# Patient Record
Sex: MALE | Race: BLACK OR AFRICAN AMERICAN | NOT HISPANIC OR LATINO | Employment: FULL TIME | RURAL
[De-identification: names, ages, dates, MRNs, and addresses within clinical notes are randomized per-mention and may not be internally consistent; named-entity substitution may affect disease eponyms.]

---

## 2021-10-01 ENCOUNTER — TELEPHONE (OUTPATIENT)
Dept: FAMILY MEDICINE CLINIC | Facility: CLINIC | Age: 31
End: 2021-10-01

## 2021-10-04 ENCOUNTER — OFFICE VISIT (OUTPATIENT)
Dept: FAMILY MEDICINE CLINIC | Facility: CLINIC | Age: 31
End: 2021-10-04

## 2021-10-04 VITALS
HEIGHT: 74 IN | WEIGHT: 315 LBS | DIASTOLIC BLOOD PRESSURE: 76 MMHG | BODY MASS INDEX: 40.43 KG/M2 | HEART RATE: 88 BPM | TEMPERATURE: 97.8 F | OXYGEN SATURATION: 98 % | SYSTOLIC BLOOD PRESSURE: 152 MMHG

## 2021-10-04 DIAGNOSIS — I82.90 DEEP VEIN THROMBOSIS (DVT) ASSOCIATED WITH COVID-19: Primary | ICD-10-CM

## 2021-10-04 DIAGNOSIS — U07.1 DEEP VEIN THROMBOSIS (DVT) ASSOCIATED WITH COVID-19: Primary | ICD-10-CM

## 2021-10-04 PROBLEM — I10 HYPERTENSIVE DISORDER: Status: ACTIVE | Noted: 2018-02-14

## 2021-10-04 PROBLEM — M25.511 PAIN IN JOINT OF RIGHT SHOULDER: Status: ACTIVE | Noted: 2018-02-14

## 2021-10-04 PROCEDURE — 99203 OFFICE O/P NEW LOW 30 MIN: CPT | Performed by: STUDENT IN AN ORGANIZED HEALTH CARE EDUCATION/TRAINING PROGRAM

## 2021-10-04 RX ORDER — APIXABAN 5 MG (74)
1 KIT ORAL EVERY 12 HOURS
COMMUNITY
Start: 2021-09-09 | End: 2021-10-04

## 2021-10-13 ENCOUNTER — TELEPHONE (OUTPATIENT)
Dept: FAMILY MEDICINE CLINIC | Facility: CLINIC | Age: 31
End: 2021-10-13

## 2021-10-21 ENCOUNTER — TELEPHONE (OUTPATIENT)
Dept: FAMILY MEDICINE CLINIC | Facility: CLINIC | Age: 31
End: 2021-10-21

## 2021-10-21 NOTE — TELEPHONE ENCOUNTER
[facility] call and stated that the patient Still has the DVT that was there in sept. They let him go since he had this clot before just FYI

## 2021-11-01 ENCOUNTER — TELEPHONE (OUTPATIENT)
Dept: FAMILY MEDICINE CLINIC | Facility: CLINIC | Age: 31
End: 2021-11-01

## 2021-11-01 NOTE — TELEPHONE ENCOUNTER
Received the results of the venous lower ext doppler. Dr Stratton said that DVT still present, unchanged. If unimproved after 3 months, will refer to specialist.    Pt has appt on 11/4. Will receive results at that time.

## 2021-11-02 DIAGNOSIS — I82.90 DEEP VEIN THROMBOSIS (DVT) ASSOCIATED WITH COVID-19: ICD-10-CM

## 2021-11-02 DIAGNOSIS — U07.1 DEEP VEIN THROMBOSIS (DVT) ASSOCIATED WITH COVID-19: ICD-10-CM

## 2021-11-03 ENCOUNTER — TELEPHONE (OUTPATIENT)
Dept: FAMILY MEDICINE CLINIC | Facility: CLINIC | Age: 31
End: 2021-11-03

## 2021-11-04 ENCOUNTER — OFFICE VISIT (OUTPATIENT)
Dept: FAMILY MEDICINE CLINIC | Facility: CLINIC | Age: 31
End: 2021-11-04

## 2021-11-04 VITALS
OXYGEN SATURATION: 97 % | WEIGHT: 315 LBS | HEART RATE: 88 BPM | BODY MASS INDEX: 40.43 KG/M2 | TEMPERATURE: 97.8 F | HEIGHT: 74 IN | DIASTOLIC BLOOD PRESSURE: 88 MMHG | SYSTOLIC BLOOD PRESSURE: 158 MMHG

## 2021-11-04 DIAGNOSIS — I82.90 DEEP VEIN THROMBOSIS (DVT) ASSOCIATED WITH COVID-19: Primary | ICD-10-CM

## 2021-11-04 DIAGNOSIS — U07.1 DEEP VEIN THROMBOSIS (DVT) ASSOCIATED WITH COVID-19: Primary | ICD-10-CM

## 2021-11-04 PROCEDURE — 99213 OFFICE O/P EST LOW 20 MIN: CPT | Performed by: STUDENT IN AN ORGANIZED HEALTH CARE EDUCATION/TRAINING PROGRAM

## 2021-11-04 NOTE — PROGRESS NOTES
"Subjective:  Jomar Ahn is a 31 y.o. male who presents for DVT follow-up.    DVT secondary to COVID-19 infection.  States right leg swelling has improved, pain has improved, however still larger than left.  On Eliquis, no issues medication, no easy bleeding, easy bruising, chest pain, shortness of breath, blood in stool, blood in urine, bloody cough.  Had ultrasound prior appointment, DVT still present in lower right popliteal vein as well as within deep veins in the calf.  Similar to prior exam.    Patient Active Problem List   Diagnosis   • Hypertensive disorder   • Pain in joint of right shoulder     Vitals:    Vitals:    11/04/21 1504   BP: 158/88   BP Location: Left arm   Patient Position: Sitting   Cuff Size: Large Adult   Pulse: 88   Temp: 97.8 °F (36.6 °C)   SpO2: 97%   Weight: (!) 176 kg (389 lb)   Height: 188 cm (74\")     Body mass index is 49.94 kg/m².      Current Outpatient Medications:   •  apixaban (ELIQUIS) 5 MG tablet tablet, Take 1 tablet by mouth Every 12 (Twelve) Hours., Disp: 90 tablet, Rfl: 0    Patient Active Problem List   Diagnosis   • Hypertensive disorder   • Pain in joint of right shoulder     Past Surgical History:   Procedure Laterality Date   • KNEE SURGERY Bilateral     x2     Social History     Socioeconomic History   • Marital status: Single   Tobacco Use   • Smoking status: Never Smoker   • Smokeless tobacco: Never Used   Vaping Use   • Vaping Use: Never used   Substance and Sexual Activity   • Alcohol use: No   • Drug use: Never   • Sexual activity: Defer     History reviewed. No pertinent family history.  No visits with results within 6 Month(s) from this visit.   Latest known visit with results is:   No results found for any previous visit.      No image results found.      @[email or web address]@  Immunization History   Administered Date(s) Administered   • Td 08/24/2004     The following portions of the patient's history were reviewed and updated as appropriate: allergies, current " medications, past family history, past medical history, past social history, past surgical history and problem list.    PHQ-9 Total Score:           Physical Exam  Constitutional:       General: He is not in acute distress.  HENT:      Head: Normocephalic and atraumatic.   Cardiovascular:      Rate and Rhythm: Normal rate and regular rhythm.      Heart sounds: Normal heart sounds. No murmur heard.      Pulmonary:      Effort: Pulmonary effort is normal.      Breath sounds: Normal breath sounds.   Abdominal:      Palpations: Abdomen is soft.      Tenderness: There is no abdominal tenderness.   Musculoskeletal:         General: No swelling or tenderness.      Right lower leg: Edema present.      Left lower leg: No edema.   Skin:     Coloration: Skin is not jaundiced.      Findings: No rash.   Neurological:      Mental Status: He is alert and oriented to person, place, and time. Mental status is at baseline.   Psychiatric:         Mood and Affect: Mood normal.         Behavior: Behavior normal.       Assessment/Plan    Diagnosis Plan   1. Deep vein thrombosis (DVT) associated with COVID-19        No orders of the defined types were placed in this encounter.    DVT; unfortunate ultrasound with no improvement of clot however patient clinically improving, tolerate medications well, no adverse effects, no exacerbation of symptoms, no signs of PE.  Reassuring.  Follow-up in 1 month, consider repeat ultrasound versus continued anticoagulation versus referral to specialist at that time.  Strict ED/return precautions given.  Additionally, discussed possible benefit of compression stockings, patient declined.          This document has been electronically signed by Oswaldo Stratton MD on November 4, 2021 16:03 CDT

## 2021-12-06 ENCOUNTER — LAB (OUTPATIENT)
Dept: LAB | Facility: HOSPITAL | Age: 31
End: 2021-12-06

## 2021-12-06 ENCOUNTER — OFFICE VISIT (OUTPATIENT)
Dept: FAMILY MEDICINE CLINIC | Facility: CLINIC | Age: 31
End: 2021-12-06

## 2021-12-06 VITALS
OXYGEN SATURATION: 100 % | HEART RATE: 74 BPM | BODY MASS INDEX: 40.43 KG/M2 | WEIGHT: 315 LBS | SYSTOLIC BLOOD PRESSURE: 164 MMHG | HEIGHT: 74 IN | TEMPERATURE: 97.8 F | DIASTOLIC BLOOD PRESSURE: 88 MMHG

## 2021-12-06 DIAGNOSIS — U07.1 DEEP VEIN THROMBOSIS (DVT) ASSOCIATED WITH COVID-19: Primary | ICD-10-CM

## 2021-12-06 DIAGNOSIS — I82.90 DEEP VEIN THROMBOSIS (DVT) ASSOCIATED WITH COVID-19: Primary | ICD-10-CM

## 2021-12-06 PROCEDURE — 85379 FIBRIN DEGRADATION QUANT: CPT | Performed by: STUDENT IN AN ORGANIZED HEALTH CARE EDUCATION/TRAINING PROGRAM

## 2021-12-06 PROCEDURE — 99213 OFFICE O/P EST LOW 20 MIN: CPT | Performed by: STUDENT IN AN ORGANIZED HEALTH CARE EDUCATION/TRAINING PROGRAM

## 2021-12-06 PROCEDURE — 85025 COMPLETE CBC W/AUTO DIFF WBC: CPT | Performed by: STUDENT IN AN ORGANIZED HEALTH CARE EDUCATION/TRAINING PROGRAM

## 2021-12-07 LAB
BASOPHILS # BLD AUTO: 0.04 10*3/MM3 (ref 0–0.2)
BASOPHILS NFR BLD AUTO: 0.7 % (ref 0–1.5)
D-DIMER, QUANTITATIVE (MAD,POW, STR): 319 NG/ML (FEU) (ref 0–470)
DEPRECATED RDW RBC AUTO: 37.4 FL (ref 37–54)
EOSINOPHIL # BLD AUTO: 0.21 10*3/MM3 (ref 0–0.4)
EOSINOPHIL NFR BLD AUTO: 3.4 % (ref 0.3–6.2)
ERYTHROCYTE [DISTWIDTH] IN BLOOD BY AUTOMATED COUNT: 13.4 % (ref 12.3–15.4)
HCT VFR BLD AUTO: 42.6 % (ref 37.5–51)
HGB BLD-MCNC: 14.1 G/DL (ref 13–17.7)
IMM GRANULOCYTES # BLD AUTO: 0.02 10*3/MM3 (ref 0–0.05)
IMM GRANULOCYTES NFR BLD AUTO: 0.3 % (ref 0–0.5)
LYMPHOCYTES # BLD AUTO: 1.75 10*3/MM3 (ref 0.7–3.1)
LYMPHOCYTES NFR BLD AUTO: 28.7 % (ref 19.6–45.3)
MCH RBC QN AUTO: 26.1 PG (ref 26.6–33)
MCHC RBC AUTO-ENTMCNC: 33.1 G/DL (ref 31.5–35.7)
MCV RBC AUTO: 78.7 FL (ref 79–97)
MONOCYTES # BLD AUTO: 0.63 10*3/MM3 (ref 0.1–0.9)
MONOCYTES NFR BLD AUTO: 10.3 % (ref 5–12)
NEUTROPHILS NFR BLD AUTO: 3.45 10*3/MM3 (ref 1.7–7)
NEUTROPHILS NFR BLD AUTO: 56.6 % (ref 42.7–76)
NRBC BLD AUTO-RTO: 0 /100 WBC (ref 0–0.2)
PLATELET # BLD AUTO: 284 10*3/MM3 (ref 140–450)
PMV BLD AUTO: 9.8 FL (ref 6–12)
RBC # BLD AUTO: 5.41 10*6/MM3 (ref 4.14–5.8)
WBC NRBC COR # BLD: 6.1 10*3/MM3 (ref 3.4–10.8)

## 2022-01-05 DIAGNOSIS — U07.1 DEEP VEIN THROMBOSIS (DVT) ASSOCIATED WITH COVID-19: ICD-10-CM

## 2022-01-05 DIAGNOSIS — I82.90 DEEP VEIN THROMBOSIS (DVT) ASSOCIATED WITH COVID-19: ICD-10-CM

## 2022-01-05 NOTE — TELEPHONE ENCOUNTER
Rx Refill Note  Requested Prescriptions     Pending Prescriptions Disp Refills   • apixaban (ELIQUIS) 5 MG tablet tablet 90 tablet 0     Sig: Take 1 tablet by mouth Every 12 (Twelve) Hours.      Last office visit with prescribing clinician: 12/6/2021      Next office visit with prescribing clinician: 3/7/2022            Jake Read Rep  01/05/22, 15:06 CST

## 2022-01-09 NOTE — PROGRESS NOTES
"Subjective:  Jomar Ahn is a 31 y.o. male who presents for     DVT; diagnosed in August after acute Covid infection, states swelling still present, worse with walking, standing, working, better with rest.  Denied any chest pain, shortness of breath, headaches, vision changes, hemoptysis, increased leg swelling, leg cramping, foot discoloration, skin lesions.  States has been adherent to Eliquis, but has had minimal improvement of left leg swelling.      Patient Active Problem List   Diagnosis   • Hypertensive disorder   • Pain in joint of right shoulder     Vitals:    Vitals:    12/06/21 1501   BP: 164/88   BP Location: Left arm   Patient Position: Sitting   Cuff Size: Large Adult   Pulse: 74   Temp: 97.8 °F (36.6 °C)   SpO2: 100%   Weight: (!) 177 kg (390 lb)   Height: 188 cm (74\")     Body mass index is 50.07 kg/m².      Current Outpatient Medications:   •  apixaban (ELIQUIS) 5 MG tablet tablet, Take 1 tablet by mouth Every 12 (Twelve) Hours for 30 days., Disp: 60 tablet, Rfl: 0    Patient Active Problem List   Diagnosis   • Hypertensive disorder   • Pain in joint of right shoulder     Past Surgical History:   Procedure Laterality Date   • KNEE SURGERY Bilateral     x2     Social History     Socioeconomic History   • Marital status: Single   Tobacco Use   • Smoking status: Never Smoker   • Smokeless tobacco: Never Used   Vaping Use   • Vaping Use: Never used   Substance and Sexual Activity   • Alcohol use: No   • Drug use: Never   • Sexual activity: Defer     History reviewed. No pertinent family history.  No visits with results within 6 Month(s) from this visit.   Latest known visit with results is:   No results found for any previous visit.      No image results found.      [unfilled]  Immunization History   Administered Date(s) Administered   • Td 08/24/2004     The following portions of the patient's history were reviewed and updated as appropriate: allergies, current medications, past family history, " past medical history, past social history, past surgical history and problem list.    PHQ-9 Total Score:           Physical Exam  Constitutional:       General: He is not in acute distress.  HENT:      Head: Normocephalic and atraumatic.   Cardiovascular:      Rate and Rhythm: Normal rate and regular rhythm.      Heart sounds: Normal heart sounds. No murmur heard.      Pulmonary:      Effort: Pulmonary effort is normal.      Breath sounds: Normal breath sounds.   Abdominal:      Palpations: Abdomen is soft.      Tenderness: There is no abdominal tenderness.   Musculoskeletal:         General: No swelling.      Right lower leg: No edema.      Left lower leg: Edema present.   Skin:     Coloration: Skin is not jaundiced.      Findings: No rash.   Neurological:      Mental Status: He is alert and oriented to person, place, and time. Mental status is at baseline.   Psychiatric:         Mood and Affect: Mood normal.         Behavior: Behavior normal.         Assessment/Plan    Diagnosis Plan   1. Deep vein thrombosis (DVT) associated with COVID-19  Ambulatory Referral to Vascular Surgery    CBC & Differential    D-dimer, Quantitative    Duplex Venous Lower Extremity - Bilateral CAR      Orders Placed This Encounter   Procedures   • D-dimer, Quantitative     Order Specific Question:   Release to patient     Answer:   Immediate   • CBC Auto Differential   • Ambulatory Referral to Vascular Surgery     Referral Priority:   Routine     Referral Type:   Consultation     Referral Reason:   Specialty Services Required     Requested Specialty:   Vascular Surgery     Number of Visits Requested:   1   • CBC & Differential     Order Specific Question:   Manual Differential     Answer:   No     DVT; labs as above, repeat ultrasound, refer to vascular surgery due to concern for post thrombotic syndrome, no red flags on exam, reassuring.  Will continue Eliquis until seen by specialist.  Patient voiced understanding, agreeable to  plan.          This document has been electronically signed by Oswaldo Stratton MD on January 9, 2022 13:47 CST    EMR Dragon/Transciption Disclaimer: Some of this note may be an electronic transcription/translation of spoken language to printed text.  The electronic translation of spoken language may permit erroneous, or at times, nonsensical words or phrases to be inadvertently transcribed. Although I have reviewed the note for such errors, some may still exist.

## 2022-01-26 DIAGNOSIS — I82.531 CHRONIC DEEP VEIN THROMBOSIS (DVT) OF POPLITEAL VEIN OF RIGHT LOWER EXTREMITY: Primary | ICD-10-CM

## 2022-03-07 ENCOUNTER — OFFICE VISIT (OUTPATIENT)
Dept: FAMILY MEDICINE CLINIC | Facility: CLINIC | Age: 32
End: 2022-03-07

## 2022-03-07 VITALS
BODY MASS INDEX: 40.43 KG/M2 | DIASTOLIC BLOOD PRESSURE: 100 MMHG | SYSTOLIC BLOOD PRESSURE: 154 MMHG | TEMPERATURE: 98 F | HEART RATE: 96 BPM | HEIGHT: 74 IN | OXYGEN SATURATION: 98 % | WEIGHT: 315 LBS

## 2022-03-07 DIAGNOSIS — I82.90 DEEP VEIN THROMBOSIS (DVT) ASSOCIATED WITH COVID-19: Primary | ICD-10-CM

## 2022-03-07 DIAGNOSIS — I10 PRIMARY HYPERTENSION: ICD-10-CM

## 2022-03-07 DIAGNOSIS — U07.1 DEEP VEIN THROMBOSIS (DVT) ASSOCIATED WITH COVID-19: Primary | ICD-10-CM

## 2022-03-07 DIAGNOSIS — Z91.89 AT RISK FOR OBSTRUCTIVE SLEEP APNEA: ICD-10-CM

## 2022-03-07 PROCEDURE — 99214 OFFICE O/P EST MOD 30 MIN: CPT | Performed by: STUDENT IN AN ORGANIZED HEALTH CARE EDUCATION/TRAINING PROGRAM

## 2022-03-07 RX ORDER — LOSARTAN POTASSIUM 50 MG/1
50 TABLET ORAL NIGHTLY
Qty: 30 TABLET | Refills: 0 | Status: SHIPPED | OUTPATIENT
Start: 2022-03-07 | End: 2022-08-26 | Stop reason: SDUPTHER

## 2022-03-07 NOTE — PROGRESS NOTES
"Subjective:  Jomar Ahn is a 31 y.o. male who presents for     DVT; states swelling of the right leg has improved, no issues with Eliquis, no shortness of breath, chest pain, orthopnea, exercise intolerance, easy bleeding, easy bruising, hematuria, hematochezia, hematemesis.  Was referred to vascular surgery for concerns of post thrombotic syndrome, did not obtain duplex ultrasound and was unable to make appointment due to issues at home.    HTN; does not check blood pressure at home, has been consistently elevated over the last several appointments.  Has never been on medications for blood pressure in the past, does admit to snoring, nonrestorative sleep.  Denied any headaches, vision changes, numbness, tingling, weakness.      Patient Active Problem List   Diagnosis   • Hypertensive disorder   • Pain in joint of right shoulder     Vitals:    Vitals:    03/07/22 1621   BP: 154/100   BP Location: Left arm   Patient Position: Sitting   Cuff Size: Large Adult   Pulse: 96   Temp: 98 °F (36.7 °C)   SpO2: 98%   Weight: (!) 173 kg (381 lb)   Height: 188 cm (74\")     Body mass index is 48.92 kg/m².      Current Outpatient Medications:   •  apixaban (ELIQUIS) 5 MG tablet tablet, Take 1 tablet by mouth Every 12 (Twelve) Hours., Disp: 60 tablet, Rfl: 0  •  losartan (Cozaar) 50 MG tablet, Take 1 tablet by mouth Every Night., Disp: 30 tablet, Rfl: 0    Patient Active Problem List   Diagnosis   • Hypertensive disorder   • Pain in joint of right shoulder     Past Surgical History:   Procedure Laterality Date   • KNEE SURGERY Bilateral     x2     Social History     Socioeconomic History   • Marital status: Single   Tobacco Use   • Smoking status: Never Smoker   • Smokeless tobacco: Never Used   Vaping Use   • Vaping Use: Never used   Substance and Sexual Activity   • Alcohol use: No   • Drug use: Never   • Sexual activity: Defer     History reviewed. No pertinent family history.  Office Visit on 12/06/2021   Component Date " Value Ref Range Status   • D-Dimer, Quantitative 12/06/2021 319  0 - 470 ng/mL (FEU) Final   • WBC 12/06/2021 6.10  3.40 - 10.80 10*3/mm3 Final   • RBC 12/06/2021 5.41  4.14 - 5.80 10*6/mm3 Final   • Hemoglobin 12/06/2021 14.1  13.0 - 17.7 g/dL Final   • Hematocrit 12/06/2021 42.6  37.5 - 51.0 % Final   • MCV 12/06/2021 78.7 (A) 79.0 - 97.0 fL Final   • MCH 12/06/2021 26.1 (A) 26.6 - 33.0 pg Final   • MCHC 12/06/2021 33.1  31.5 - 35.7 g/dL Final   • RDW 12/06/2021 13.4  12.3 - 15.4 % Final   • RDW-SD 12/06/2021 37.4  37.0 - 54.0 fl Final   • MPV 12/06/2021 9.8  6.0 - 12.0 fL Final   • Platelets 12/06/2021 284  140 - 450 10*3/mm3 Final   • Neutrophil % 12/06/2021 56.6  42.7 - 76.0 % Final   • Lymphocyte % 12/06/2021 28.7  19.6 - 45.3 % Final   • Monocyte % 12/06/2021 10.3  5.0 - 12.0 % Final   • Eosinophil % 12/06/2021 3.4  0.3 - 6.2 % Final   • Basophil % 12/06/2021 0.7  0.0 - 1.5 % Final   • Immature Grans % 12/06/2021 0.3  0.0 - 0.5 % Final   • Neutrophils, Absolute 12/06/2021 3.45  1.70 - 7.00 10*3/mm3 Final   • Lymphocytes, Absolute 12/06/2021 1.75  0.70 - 3.10 10*3/mm3 Final   • Monocytes, Absolute 12/06/2021 0.63  0.10 - 0.90 10*3/mm3 Final   • Eosinophils, Absolute 12/06/2021 0.21  0.00 - 0.40 10*3/mm3 Final   • Basophils, Absolute 12/06/2021 0.04  0.00 - 0.20 10*3/mm3 Final   • Immature Grans, Absolute 12/06/2021 0.02  0.00 - 0.05 10*3/mm3 Final   • nRBC 12/06/2021 0.0  0.0 - 0.2 /100 WBC Final      No image results found.      [unfilled]  Immunization History   Administered Date(s) Administered   • Td 08/24/2004     The following portions of the patient's history were reviewed and updated as appropriate: allergies, current medications, past family history, past medical history, past social history, past surgical history and problem list.    PHQ-9 Total Score: 0         Physical Exam  Constitutional:       General: He is not in acute distress.     Appearance: He is obese.   HENT:      Head: Normocephalic  and atraumatic.   Cardiovascular:      Rate and Rhythm: Normal rate and regular rhythm.      Heart sounds: Normal heart sounds. No murmur heard.  Pulmonary:      Effort: Pulmonary effort is normal.      Breath sounds: Normal breath sounds.   Abdominal:      Palpations: Abdomen is soft.      Tenderness: There is no abdominal tenderness.   Musculoskeletal:         General: No swelling.      Right lower leg: Edema ( Minimally larger than right, much improved from previous.  No overlying skin changes.) present.      Left lower leg: No edema.   Skin:     Coloration: Skin is not jaundiced.      Findings: No rash.   Neurological:      Mental Status: He is alert and oriented to person, place, and time. Mental status is at baseline.   Psychiatric:         Mood and Affect: Mood normal.         Behavior: Behavior normal.         Assessment/Plan    Diagnosis Plan   1. Deep vein thrombosis (DVT) associated with COVID-19  Duplex Venous Lower Extremity - Bilateral CAR    apixaban (ELIQUIS) 5 MG tablet tablet   2. Primary hypertension  losartan (Cozaar) 50 MG tablet   3. At risk for obstructive sleep apnea  Ambulatory Referral to Sleep Medicine      Orders Placed This Encounter   Procedures   • Ambulatory Referral to Sleep Medicine     Referral Priority:   Routine     Number of Visits Requested:   1     DVT; improved from previous, continue to encourage compression stockings, duplex ultrasound, vascular surgeon follow-up.  If ultrasound negative, stop Eliquis as it was provoked event.     Hypertension; new diagnosis, discussed importance of blood pressure control, risk of uncontrolled hypertension.  Due to symptoms of sleep apnea, will refer for testing.  Asymptomatic, patient to bring in blood pressure log to follow-up in 1 month, after discussion, will start on losartan 50 mg daily.  Strict ER/return precautions given.          This document has been electronically signed by Oswaldo Stratton MD on March 7, 2022 16:36 CST    EMR  Dragon/Transciption Disclaimer: Some of this note may be an electronic transcription/translation of spoken language to printed text.  The electronic translation of spoken language may permit erroneous, or at times, nonsensical words or phrases to be inadvertently transcribed. Although I have reviewed the note for such errors, some may still exist.

## 2022-03-25 ENCOUNTER — OFFICE VISIT (OUTPATIENT)
Dept: SLEEP MEDICINE | Facility: HOSPITAL | Age: 32
End: 2022-03-25

## 2022-03-25 VITALS
HEART RATE: 90 BPM | SYSTOLIC BLOOD PRESSURE: 169 MMHG | OXYGEN SATURATION: 96 % | BODY MASS INDEX: 40.43 KG/M2 | WEIGHT: 315 LBS | HEIGHT: 74 IN | DIASTOLIC BLOOD PRESSURE: 102 MMHG

## 2022-03-25 DIAGNOSIS — E66.01 MORBID (SEVERE) OBESITY DUE TO EXCESS CALORIES: ICD-10-CM

## 2022-03-25 DIAGNOSIS — G47.19 EXCESSIVE DAYTIME SLEEPINESS: ICD-10-CM

## 2022-03-25 DIAGNOSIS — R06.83 SNORING: Primary | ICD-10-CM

## 2022-03-25 PROCEDURE — 99202 OFFICE O/P NEW SF 15 MIN: CPT | Performed by: NURSE PRACTITIONER

## 2022-03-25 NOTE — PROGRESS NOTES
New Patient Sleep Medicine Consultation    Encounter Date: 3/25/2022         Patient's PCP: Oswaldo Stratton MD  Referring provider: Oswaldo Stratton MD  Reason for consultation chief complaint: snoring and excessive daytime sleepiness, morning headaches     Jomar Ahn is a 31 y.o. male whose bedtime is ~ 2100. He  falls asleep after 20-30 minutes, and is up 1-2 times per night.Able to fall back asleep quickly. He wakes up ~ 0400.On the weekend he goes to bed ~ 2300 and gets up around [id number].  He endorses 6-8 hours of sleep. He drinks 0 cups of coffee, 0 teas, and 0 sodas per day. He drinks 0 alcoholic beverages per week.      Jomar Ahn admits to snoring, High blod pressure, excessive daytime sleepiness, morning headaches and Up to the bathroom at night. He denies cataplexy, sleep paralysis, or hypnagogic hallucinations. He takes no medicine for sleep. He has no sleepiness with driving. He naps occasionally. He has never had a sleep study. He sleeps in a bed alone.     He is a never smoker.       Marital status: single   Occupation: factory   Children: 0  FH of sleep disorders: not that he knows of       Past Medical History:   Diagnosis Date   • Bronchitis, chronic (HCC)    • Deep vein thrombosis (HCC)      Social History     Socioeconomic History   • Marital status: Single   Tobacco Use   • Smoking status: Never Smoker   • Smokeless tobacco: Never Used   Vaping Use   • Vaping Use: Never used   Substance and Sexual Activity   • Alcohol use: No   • Drug use: Never   • Sexual activity: Defer     No family history on file.      Review of Systems:  Constitutional: negative  Eyes: negative  Ears, nose, mouth, throat, and face: negative  Respiratory: negative  Cardiovascular: negative  Gastrointestinal: negative  Genitourinary:negative  Integument/breast: negative  Hematologic/lymphatic: negative  Musculoskeletal:negative  Neurological: negative  Behavioral/Psych: negative  Endocrine:  "negative  Allergic/Immunologic: negative Patient advised to discuss any positive ROS with PCP.      [place] - 6      Vitals:    03/25/22 1559   BP: (!) 169/102   Pulse: 90   SpO2: 96%           03/25/22  1559   Weight: (!) 176 kg (387 lb 8 oz)       Body mass index is 49.73 kg/m². Patient's Body mass index is 49.73 kg/m². indicating that he is morbidly obese (BMI > 40 or > 35 with obesity - related health condition). Obesity-related health conditions include the following: hypertension. Obesity is unchanged. BMI is is above average; BMI management plan is completed. We discussed portion control and increasing exercise..      Neck circumference: 20\"          General: Alert. Cooperative. Well developed. No acute distress.             Head:  Normocephalic. Symmetrical. Atraumatic.              Eyes: Sclera clear. No icterus. Normal EOM.             Ears: No deformities. Normal hearing.             Nose: No septal deviation. No drainage.          Throat: No oral lesions. No thrush. Moist mucous membranes. Trachea midline    Tongue is enlarged    Dentition is fair       Pharynx: Posterior pharyngeal pillars are narrow    Mallampati score of IV (only hard palate visible)    Pharynx is nonerythematous   Chest Wall:  Normal shape. Symmetric expansion with respiration. No tenderness.          Lungs:  Clear to auscultation bilaterally. No wheezes. No rhonchi. No rales. Respirations regular, even and unlabored.            Heart:  Regular rhythm and normal rate. Normal S1 and S2. No murmur.  Extremities:  Moves all extremities well. No edema.               Skin: Dry. Intact. No bleeding. No rash.           Neuro: Moves all 4 extremities and cranial nerves grossly intact.  Psychiatric: Normal mood and affect.      Current Outpatient Medications:   •  apixaban (ELIQUIS) 5 MG tablet tablet, Take 1 tablet by mouth Every 12 (Twelve) Hours., Disp: 60 tablet, Rfl: 0  •  losartan (Cozaar) 50 MG tablet, Take 1 tablet by mouth Every " Night., Disp: 30 tablet, Rfl: 0    Lab Results   Component Value Date    WBC 6.10 12/06/2021    HGB 14.1 12/06/2021    HCT 42.6 12/06/2021    MCV 78.7 (L) 12/06/2021     12/06/2021     No results found for: GLUCOSE, CALCIUM, NA, K, CO2, CL, BUN, CREATININE, EGFRIFAFRI, EGFRIFNONA, BCR, ANIONGAP  No results found for: INR, PROTIME  No results found for: CKTOTAL, CKMB, CKMBINDEX, TROPONINI, TROPONINT    No results found for: PHART, HIA9SZD, PO2ART]    Contraindications to home sleep test: none    Assessment and Plan:    1. Excessive daytime sleepiness- New (to me), additional work-up planned (4)  1. Check HST  2. Good sleep hygiene   3. Drowsy driving tips- do not drive if feeling sleepy   4. RTC in 2 weeks with study results   2.   Snoring- New to me, additional work-up planned    1. As above   4.   Morbid obesity   5.   Morning headaches       I obtained a brief history from the patient, reviewed the medical problems and current medications, and made medical decisions regarding treatment based on that information.   I spent 23 minutes caring for Jomar on this date of service. This time includes time spent by me in the following activities: preparing for the visit, obtaining and/or reviewing a separately obtained history, performing a medically appropriate examination and/or evaluation, counseling and educating the patient/family/caregiver, ordering medications, tests, or procedures and documenting information in the medical record. I answered all of his questions and he verbalized understanding. Discussion involved sleep hygiene, sleep apnea, health impact of sleep apnea, home sleep testing, CPAP and follow-up.           RTC 2 weeks after study for results.             This document has been electronically signed by KESHIA Jones on March 25, 2022 16:01 CDT          This document has been electronically signed by KESHIA Jones on March 25, 2022         CC: Oswaldo Stratton MD [place],  Oswaldo Robles MD

## 2022-04-04 ENCOUNTER — OFFICE VISIT (OUTPATIENT)
Dept: CARDIAC SURGERY | Facility: CLINIC | Age: 32
End: 2022-04-04

## 2022-04-04 VITALS
OXYGEN SATURATION: 97 % | HEIGHT: 74 IN | HEART RATE: 99 BPM | WEIGHT: 315 LBS | SYSTOLIC BLOOD PRESSURE: 148 MMHG | BODY MASS INDEX: 40.43 KG/M2 | TEMPERATURE: 98.4 F | DIASTOLIC BLOOD PRESSURE: 88 MMHG

## 2022-04-04 DIAGNOSIS — U07.1 DEEP VEIN THROMBOSIS (DVT) ASSOCIATED WITH COVID-19: ICD-10-CM

## 2022-04-04 DIAGNOSIS — I10 BENIGN ESSENTIAL HTN: Primary | ICD-10-CM

## 2022-04-04 DIAGNOSIS — I82.90 DEEP VEIN THROMBOSIS (DVT) ASSOCIATED WITH COVID-19: ICD-10-CM

## 2022-04-04 DIAGNOSIS — G47.33 OSA (OBSTRUCTIVE SLEEP APNEA): ICD-10-CM

## 2022-04-04 DIAGNOSIS — E66.01 CLASS 3 SEVERE OBESITY DUE TO EXCESS CALORIES WITH SERIOUS COMORBIDITY AND BODY MASS INDEX (BMI) OF 45.0 TO 49.9 IN ADULT: ICD-10-CM

## 2022-04-04 PROCEDURE — 99204 OFFICE O/P NEW MOD 45 MIN: CPT | Performed by: THORACIC SURGERY (CARDIOTHORACIC VASCULAR SURGERY)

## 2022-04-06 ENCOUNTER — HOSPITAL ENCOUNTER (OUTPATIENT)
Dept: SLEEP MEDICINE | Facility: HOSPITAL | Age: 32
Discharge: HOME | End: 2022-04-06
Admitting: NURSE PRACTITIONER

## 2022-04-06 DIAGNOSIS — E66.01 MORBID (SEVERE) OBESITY DUE TO EXCESS CALORIES: ICD-10-CM

## 2022-04-06 DIAGNOSIS — R06.83 SNORING: ICD-10-CM

## 2022-04-06 DIAGNOSIS — G47.19 EXCESSIVE DAYTIME SLEEPINESS: ICD-10-CM

## 2022-04-06 PROCEDURE — 95800 SLP STDY UNATTENDED: CPT

## 2022-04-06 PROCEDURE — 95800 SLP STDY UNATTENDED: CPT | Performed by: PSYCHIATRY & NEUROLOGY

## 2022-04-07 ENCOUNTER — PATIENT ROUNDING (BHMG ONLY) (OUTPATIENT)
Dept: CARDIAC SURGERY | Facility: CLINIC | Age: 32
End: 2022-04-07

## 2022-04-07 ENCOUNTER — OFFICE VISIT (OUTPATIENT)
Dept: FAMILY MEDICINE CLINIC | Facility: CLINIC | Age: 32
End: 2022-04-07

## 2022-04-07 VITALS
OXYGEN SATURATION: 98 % | DIASTOLIC BLOOD PRESSURE: 100 MMHG | SYSTOLIC BLOOD PRESSURE: 180 MMHG | WEIGHT: 188 LBS | HEART RATE: 98 BPM | TEMPERATURE: 98.7 F | HEIGHT: 74 IN | BODY MASS INDEX: 24.13 KG/M2

## 2022-04-07 DIAGNOSIS — I10 PRIMARY HYPERTENSION: Primary | ICD-10-CM

## 2022-04-07 DIAGNOSIS — I82.90 DEEP VEIN THROMBOSIS (DVT) ASSOCIATED WITH COVID-19: ICD-10-CM

## 2022-04-07 DIAGNOSIS — U07.1 DEEP VEIN THROMBOSIS (DVT) ASSOCIATED WITH COVID-19: ICD-10-CM

## 2022-04-07 PROCEDURE — 99213 OFFICE O/P EST LOW 20 MIN: CPT | Performed by: STUDENT IN AN ORGANIZED HEALTH CARE EDUCATION/TRAINING PROGRAM

## 2022-04-07 NOTE — PATIENT INSTRUCTIONS
Hypertension, Adult  Hypertension is another name for high blood pressure. High blood pressure forces your heart to work harder to pump blood. This can cause problems over time.  There are two numbers in a blood pressure reading. There is a top number (systolic) over a bottom number (diastolic). It is best to have a blood pressure that is below 120/80. Healthy choices can help lower your blood pressure, or you may need medicine to help lower it.  What are the causes?  The cause of this condition is not known. Some conditions may be related to high blood pressure.  What increases the risk?  Smoking.  Having type 2 diabetes mellitus, high cholesterol, or both.  Not getting enough exercise or physical activity.  Being overweight.  Having too much fat, sugar, calories, or salt (sodium) in your diet.  Drinking too much alcohol.  Having long-term (chronic) kidney disease.  Having a family history of high blood pressure.  Age. Risk increases with age.  Race. You may be at higher risk if you are .  Gender. Men are at higher risk than women before age 45. After age 65, women are at higher risk than men.  Having obstructive sleep apnea.  Stress.  What are the signs or symptoms?  High blood pressure may not cause symptoms. Very high blood pressure (hypertensive crisis) may cause:  Headache.  Feelings of worry or nervousness (anxiety).  Shortness of breath.  Nosebleed.  A feeling of being sick to your stomach (nausea).  Throwing up (vomiting).  Changes in how you see.  Very bad chest pain.  Seizures.  How is this treated?  This condition is treated by making healthy lifestyle changes, such as:  Eating healthy foods.  Exercising more.  Drinking less alcohol.  Your health care provider may prescribe medicine if lifestyle changes are not enough to get your blood pressure under control, and if:  Your top number is above 130.  Your bottom number is above 80.  Your personal target blood pressure may vary.  Follow  these instructions at home:  Eating and drinking    If told, follow the DASH eating plan. To follow this plan:  Fill one half of your plate at each meal with fruits and vegetables.  Fill one fourth of your plate at each meal with whole grains. Whole grains include whole-wheat pasta, brown rice, and whole-grain bread.  Eat or drink low-fat dairy products, such as skim milk or low-fat yogurt.  Fill one fourth of your plate at each meal with low-fat (lean) proteins. Low-fat proteins include fish, chicken without skin, eggs, beans, and tofu.  Avoid fatty meat, cured and processed meat, or chicken with skin.  Avoid pre-made or processed food.  Eat less than 1,500 mg of salt each day.  Do not drink alcohol if:  Your doctor tells you not to drink.  You are pregnant, may be pregnant, or are planning to become pregnant.  If you drink alcohol:  Limit how much you use to:  0-1 drink a day for women.  0-2 drinks a day for men.  Be aware of how much alcohol is in your drink. In the [place] one drink equals one 12 oz bottle of beer (355 mL), one 5 oz glass of wine (148 mL), or one 1½ oz glass of hard liquor (44 mL).    Lifestyle    Work with your doctor to stay at a healthy weight or to lose weight. Ask your doctor what the best weight is for you.  Get at least 30 minutes of exercise most days of the week. This may include walking, swimming, or biking.  Get at least 30 minutes of exercise that strengthens your muscles (resistance exercise) at least 3 days a week. This may include lifting weights or doing Pilates.  Do not use any products that contain nicotine or tobacco, such as cigarettes, e-cigarettes, and chewing tobacco. If you need help quitting, ask your doctor.  Check your blood pressure at home as told by your doctor.  Keep all follow-up visits as told by your doctor. This is important.    Medicines  Take over-the-counter and prescription medicines only as told by your doctor. Follow directions carefully.  Do not skip doses  of blood pressure medicine. The medicine does not work as well if you skip doses. Skipping doses also puts you at risk for problems.  Ask your doctor about side effects or reactions to medicines that you should watch for.  Contact a doctor if you:  Think you are having a reaction to the medicine you are taking.  Have headaches that keep coming back (recurring).  Feel dizzy.  Have swelling in your ankles.  Have trouble with your vision.  Get help right away if you:  Get a very bad headache.  Start to feel mixed up (confused).  Feel weak or numb.  Feel faint.  Have very bad pain in your:  Chest.  Belly (abdomen).  Throw up more than once.  Have trouble breathing.  Summary  Hypertension is another name for high blood pressure.  High blood pressure forces your heart to work harder to pump blood.  For most people, a normal blood pressure is less than 120/80.  Making healthy choices can help lower blood pressure. If your blood pressure does not get lower with healthy choices, you may need to take medicine.  This information is not intended to replace advice given to you by your health care provider. Make sure you discuss any questions you have with your health care provider.  Document Revised: 08/28/2019 Document Reviewed: 08/28/2019  [employer] Patient Education © 2021 [employer]

## 2022-04-07 NOTE — PROGRESS NOTES
April 7, 2022    Hello, may I speak with Jomar Ahn?    My name is ADONAY, CARDIOVASCULAR TECH      I am  with [facility] CARDIOTHORACIC SURGERY  [address] [place] [address] [address] [place] [address] [phone].    Before we get started may I verify your date of birth? 1990    I am calling to officially welcome you to our practice and ask about your recent visit. Is this a good time to talk? YES.     Tell me about your visit with us. What things went well?  YEAH, EVERYTHING WAS FINE. WENT REAL GOOD.        We're always looking for ways to make our patients' experiences even better. Do you have recommendations on ways we may improve?  NO.     Overall were you satisfied with your first visit to our practice? YES.        I appreciate you taking the time to speak with me today. Is there anything else I can do for you?  NO.       Thank you, and have a great day.

## 2022-04-07 NOTE — PROGRESS NOTES
"Subjective:  Jomar Ahn is a 31 y.o. male who presents for     HTN; states BP at home as ranged from 130-180/. Currently undergoing evaluation for sleep apnea.  At previous appointment, was started on losartan 50 mg daily.  Patient states due to financial issues, did not  medication.  Denied any symptoms of high blood pressure, no chest pain, shortness of breath, headaches, vision changes, numbness, tingling, weakness, leg swelling.    DVT; recently seen vascular surgery, states that discontinued off Eliquis due to negative ultrasound and provoked nature secondary to Covid.  No acute complaints today.        Patient Active Problem List   Diagnosis   • Hypertensive disorder   • Pain in joint of right shoulder     Vitals:    Vitals:    04/07/22 1634   BP: 180/100   BP Location: Left arm   Patient Position: Sitting   Pulse: 98   Temp: 98.7 °F (37.1 °C)   SpO2: 98%   Weight: 85.3 kg (188 lb)   Height: 188 cm (74\")     Body mass index is 24.14 kg/m².      Current Outpatient Medications:   •  losartan (Cozaar) 50 MG tablet, Take 1 tablet by mouth Every Night., Disp: 30 tablet, Rfl: 0    Patient Active Problem List   Diagnosis   • Hypertensive disorder   • Pain in joint of right shoulder     Past Surgical History:   Procedure Laterality Date   • KNEE SURGERY Bilateral     x2     Social History     Socioeconomic History   • Marital status: Single   Tobacco Use   • Smoking status: Never Smoker   • Smokeless tobacco: Never Used   Vaping Use   • Vaping Use: Never used   Substance and Sexual Activity   • Alcohol use: No   • Drug use: Never   • Sexual activity: Defer     History reviewed. No pertinent family history.  Hospital Outpatient Visit on 04/04/2022   Component Date Value Ref Range Status   • Target HR (85%) 04/04/2022 162  bpm In process   • Max. Pred. HR (100%) 04/04/2022 191  bpm In process   Office Visit on 12/06/2021   Component Date Value Ref Range Status   • D-Dimer, Quantitative 12/06/2021 319  " 0 - 470 ng/mL (FEU) Final   • WBC 12/06/2021 6.10  3.40 - 10.80 10*3/mm3 Final   • RBC 12/06/2021 5.41  4.14 - 5.80 10*6/mm3 Final   • Hemoglobin 12/06/2021 14.1  13.0 - 17.7 g/dL Final   • Hematocrit 12/06/2021 42.6  37.5 - 51.0 % Final   • MCV 12/06/2021 78.7 (A) 79.0 - 97.0 fL Final   • MCH 12/06/2021 26.1 (A) 26.6 - 33.0 pg Final   • MCHC 12/06/2021 33.1  31.5 - 35.7 g/dL Final   • RDW 12/06/2021 13.4  12.3 - 15.4 % Final   • RDW-SD 12/06/2021 37.4  37.0 - 54.0 fl Final   • MPV 12/06/2021 9.8  6.0 - 12.0 fL Final   • Platelets 12/06/2021 284  140 - 450 10*3/mm3 Final   • Neutrophil % 12/06/2021 56.6  42.7 - 76.0 % Final   • Lymphocyte % 12/06/2021 28.7  19.6 - 45.3 % Final   • Monocyte % 12/06/2021 10.3  5.0 - 12.0 % Final   • Eosinophil % 12/06/2021 3.4  0.3 - 6.2 % Final   • Basophil % 12/06/2021 0.7  0.0 - 1.5 % Final   • Immature Grans % 12/06/2021 0.3  0.0 - 0.5 % Final   • Neutrophils, Absolute 12/06/2021 3.45  1.70 - 7.00 10*3/mm3 Final   • Lymphocytes, Absolute 12/06/2021 1.75  0.70 - 3.10 10*3/mm3 Final   • Monocytes, Absolute 12/06/2021 0.63  0.10 - 0.90 10*3/mm3 Final   • Eosinophils, Absolute 12/06/2021 0.21  0.00 - 0.40 10*3/mm3 Final   • Basophils, Absolute 12/06/2021 0.04  0.00 - 0.20 10*3/mm3 Final   • Immature Grans, Absolute 12/06/2021 0.02  0.00 - 0.05 10*3/mm3 Final   • nRBC 12/06/2021 0.0  0.0 - 0.2 /100 WBC Final      No image results found.      [unfilled]  Immunization History   Administered Date(s) Administered   • Td 08/24/2004     The following portions of the patient's history were reviewed and updated as appropriate: allergies, current medications, past family history, past medical history, past social history, past surgical history and problem list.    PHQ-9 Total Score:           Physical Exam  Constitutional:       General: He is not in acute distress.     Appearance: He is obese.   HENT:      Head: Normocephalic and atraumatic.   Cardiovascular:      Rate and Rhythm: Normal  rate and regular rhythm.      Heart sounds: Normal heart sounds. No murmur heard.  Pulmonary:      Effort: Pulmonary effort is normal.      Breath sounds: Normal breath sounds.   Abdominal:      Palpations: Abdomen is soft.      Tenderness: There is no abdominal tenderness.   Musculoskeletal:         General: No swelling.      Right lower leg: No edema.      Left lower leg: No edema.   Skin:     Coloration: Skin is not jaundiced.      Findings: No rash.   Neurological:      Mental Status: He is alert and oriented to person, place, and time. Mental status is at baseline.   Psychiatric:         Mood and Affect: Mood normal.         Behavior: Behavior normal.         Assessment/Plan    Diagnosis Plan   1. Primary hypertension     2. Deep vein thrombosis (DVT) associated with COVID-19        No orders of the defined types were placed in this encounter.    Hypertension; uncontrolled today, however no red flags, reassuring.  Discussed importance of blood pressure control, risk of uncontrolled hypertension.  Patient voiced understanding, will bring in blood pressure log to follow-up, start on losartan 50 mg daily.  Continue evaluation for sleep apnea.    DVT; no longer on Eliquis doing well off medication, no signs of DVT, further clots.  Reassuring.        This document has been electronically signed by Oswaldo Stratton MD on April 7, 2022 18:00 CDT    EMR Dragon/Transciption Disclaimer: Some of this note may be an electronic transcription/translation of spoken language to printed text.  The electronic translation of spoken language may permit erroneous, or at times, nonsensical words or phrases to be inadvertently transcribed. Although I have reviewed the note for such errors, some may still exist.

## 2022-05-20 ENCOUNTER — OFFICE VISIT (OUTPATIENT)
Dept: FAMILY MEDICINE CLINIC | Facility: CLINIC | Age: 32
End: 2022-05-20

## 2022-05-20 VITALS
HEART RATE: 94 BPM | HEIGHT: 74 IN | TEMPERATURE: 97.8 F | OXYGEN SATURATION: 94 % | DIASTOLIC BLOOD PRESSURE: 80 MMHG | BODY MASS INDEX: 40.43 KG/M2 | SYSTOLIC BLOOD PRESSURE: 150 MMHG | WEIGHT: 315 LBS

## 2022-05-20 DIAGNOSIS — I10 PRIMARY HYPERTENSION: Primary | ICD-10-CM

## 2022-05-20 DIAGNOSIS — Z11.59 NEED FOR HEPATITIS C SCREENING TEST: ICD-10-CM

## 2022-05-20 PROCEDURE — 99213 OFFICE O/P EST LOW 20 MIN: CPT | Performed by: STUDENT IN AN ORGANIZED HEALTH CARE EDUCATION/TRAINING PROGRAM

## 2022-05-20 RX ORDER — PREDNISONE 20 MG/1
TABLET ORAL
COMMUNITY
End: 2022-05-20

## 2022-05-20 RX ORDER — DEXTROMETHORPHAN HYDROBROMIDE AND PROMETHAZINE HYDROCHLORIDE 15; 6.25 MG/5ML; MG/5ML
SYRUP ORAL
COMMUNITY
End: 2022-05-20

## 2022-05-20 NOTE — PROGRESS NOTES
"Subjective:  Jomar Ahn is a 31 y.o. male who presents for     Hypertension; patient states recent started on losartan, no issues medication, blood pressure has been slightly improved but still elevated.  Denied any chest pain, shortness of breath, headaches, vision changes, numbness, tingling, weakness, leg swelling, orthopnea, dyspnea exertion.  Has upcoming sleep medicine appointment, had home sleep study and is still awaiting results.      Patient Active Problem List   Diagnosis   • Hypertensive disorder   • Pain in joint of right shoulder     Vitals:    Vitals:    05/20/22 1603   BP: 150/80   BP Location: Left arm   Patient Position: Sitting   Cuff Size: Adult   Pulse: 94   Temp: 97.8 °F (36.6 °C)   SpO2: 94%   Weight: (!) 172 kg (378 lb 14.4 oz)   Height: 188 cm (74\")     Body mass index is 48.65 kg/m².      Current Outpatient Medications:   •  losartan (Cozaar) 50 MG tablet, Take 1 tablet by mouth Every Night., Disp: 30 tablet, Rfl: 0    Patient Active Problem List   Diagnosis   • Hypertensive disorder   • Pain in joint of right shoulder     Past Surgical History:   Procedure Laterality Date   • KNEE SURGERY Bilateral     x2     Social History     Socioeconomic History   • Marital status: Single   Tobacco Use   • Smoking status: Never Smoker   • Smokeless tobacco: Never Used   Vaping Use   • Vaping Use: Never used   Substance and Sexual Activity   • Alcohol use: No   • Drug use: Never   • Sexual activity: Defer     No family history on file.  Hospital Outpatient Visit on 04/04/2022   Component Date Value Ref Range Status   • Target HR (85%) 04/04/2022 162  bpm Final   • Max. Pred. HR (100%) 04/04/2022 191  bpm Final   Office Visit on 12/06/2021   Component Date Value Ref Range Status   • D-Dimer, Quantitative 12/06/2021 319  0 - 470 ng/mL (FEU) Final   • WBC 12/06/2021 6.10  3.40 - 10.80 10*3/mm3 Final   • RBC 12/06/2021 5.41  4.14 - 5.80 10*6/mm3 Final   • Hemoglobin 12/06/2021 14.1  13.0 - 17.7 g/dL " Final   • Hematocrit 12/06/2021 42.6  37.5 - 51.0 % Final   • MCV 12/06/2021 78.7 (A) 79.0 - 97.0 fL Final   • MCH 12/06/2021 26.1 (A) 26.6 - 33.0 pg Final   • MCHC 12/06/2021 33.1  31.5 - 35.7 g/dL Final   • RDW 12/06/2021 13.4  12.3 - 15.4 % Final   • RDW-SD 12/06/2021 37.4  37.0 - 54.0 fl Final   • MPV 12/06/2021 9.8  6.0 - 12.0 fL Final   • Platelets 12/06/2021 284  140 - 450 10*3/mm3 Final   • Neutrophil % 12/06/2021 56.6  42.7 - 76.0 % Final   • Lymphocyte % 12/06/2021 28.7  19.6 - 45.3 % Final   • Monocyte % 12/06/2021 10.3  5.0 - 12.0 % Final   • Eosinophil % 12/06/2021 3.4  0.3 - 6.2 % Final   • Basophil % 12/06/2021 0.7  0.0 - 1.5 % Final   • Immature Grans % 12/06/2021 0.3  0.0 - 0.5 % Final   • Neutrophils, Absolute 12/06/2021 3.45  1.70 - 7.00 10*3/mm3 Final   • Lymphocytes, Absolute 12/06/2021 1.75  0.70 - 3.10 10*3/mm3 Final   • Monocytes, Absolute 12/06/2021 0.63  0.10 - 0.90 10*3/mm3 Final   • Eosinophils, Absolute 12/06/2021 0.21  0.00 - 0.40 10*3/mm3 Final   • Basophils, Absolute 12/06/2021 0.04  0.00 - 0.20 10*3/mm3 Final   • Immature Grans, Absolute 12/06/2021 0.02  0.00 - 0.05 10*3/mm3 Final   • nRBC 12/06/2021 0.0  0.0 - 0.2 /100 WBC Final      XR Chest 2 View  Narrative: TWO VIEW CHEST    HISTORY: Cough. Fever. Influenza A x1 week.    Frontal and lateral films of the chest were obtained.    COMPARISON: None    FINDINGS:    The lungs are clear of an acute process.  The heart is not enlarged.  The pulmonary vasculature is not increased.  No pleural effusion.  No pneumothorax.  No acute osseous abnormality.  Impression: CONCLUSION:  No Acute Disease    [id number]    Electronically signed by:  Tien Moon MD  5/13/2022 12:39 PM  CDT Workstation: [id number]      @[email or web address]@  Immunization History   Administered Date(s) Administered   • Td 08/24/2004     The following portions of the patient's history were reviewed and updated as appropriate: allergies, current medications, past family history, past  medical history, past social history, past surgical history and problem list.    PHQ-9 Total Score:           Physical Exam  Constitutional:       General: He is not in acute distress.  HENT:      Head: Normocephalic and atraumatic.   Cardiovascular:      Rate and Rhythm: Normal rate and regular rhythm.      Heart sounds: Normal heart sounds. No murmur heard.  Pulmonary:      Effort: Pulmonary effort is normal.      Breath sounds: Normal breath sounds.   Abdominal:      Palpations: Abdomen is soft.      Tenderness: There is no abdominal tenderness.   Musculoskeletal:         General: No swelling.      Right lower leg: Edema present.      Left lower leg: No edema.   Skin:     Coloration: Skin is not jaundiced.      Findings: No rash.   Neurological:      Mental Status: He is alert and oriented to person, place, and time. Mental status is at baseline.   Psychiatric:         Mood and Affect: Mood normal.         Behavior: Behavior normal.       Assessment & Plan    Diagnosis Plan   1. Primary hypertension  Basic metabolic panel   2. Need for hepatitis C screening test  HCV Antibody Rfx To Qnt PCR      Orders Placed This Encounter   Procedures   • HCV Antibody Rfx To Qnt PCR     Order Specific Question:   Release to patient     Answer:   Immediate   • Basic metabolic panel     Order Specific Question:   Release to patient     Answer:   Immediate     Hypertension; doing well with losartan, still uncontrolled however improved from previous.  Strong suspicion for sleep apnea, has appropriate follow-up, will reassess need after follow-up with sleep medicine.  Labs as above, follow-up in 2 months or sooner if needed.      This document has been electronically signed by Oswaldo Stratton MD on May 20, 2022 16:29 CDT    EMR Dragon/Transciption Disclaimer: Some of this note may be an electronic transcription/translation of spoken language to printed text.  The electronic translation of spoken language may permit erroneous, or at  times, nonsensical words or phrases to be inadvertently transcribed. Although I have reviewed the note for such errors, some may still exist.

## 2022-05-23 ENCOUNTER — OFFICE VISIT (OUTPATIENT)
Dept: SLEEP MEDICINE | Facility: HOSPITAL | Age: 32
End: 2022-05-23

## 2022-05-23 VITALS
HEIGHT: 74 IN | HEART RATE: 58 BPM | OXYGEN SATURATION: 95 % | WEIGHT: 315 LBS | BODY MASS INDEX: 40.43 KG/M2 | SYSTOLIC BLOOD PRESSURE: 148 MMHG | DIASTOLIC BLOOD PRESSURE: 101 MMHG

## 2022-05-23 DIAGNOSIS — E66.01 MORBID (SEVERE) OBESITY DUE TO EXCESS CALORIES: ICD-10-CM

## 2022-05-23 DIAGNOSIS — G47.33 OSA (OBSTRUCTIVE SLEEP APNEA): Primary | ICD-10-CM

## 2022-05-23 PROCEDURE — 99212 OFFICE O/P EST SF 10 MIN: CPT | Performed by: NURSE PRACTITIONER

## 2022-05-23 NOTE — PROGRESS NOTES
Sleep Clinic Follow-Up    CHIEF COMPLAINT: follow up sleep testing    LAST OV: 03/25/2022 (I reviewed this note)    HPI:  The patient is a 31 y.o. male.  I discussed the results of the recent home sleep study performed on 04/06/2022.  Total presumed sleep time was 6 hrs 50 minutes. The AHI/TERE was 26. Mean O2 96% with a jered of 76%. No sustained hypoxia. Average pulse 75 BPM. Snoring present.       INTERVAL MEDICAL HISTORY: No change since last office visit in regard to the patient's bedtime routine, medications, or diagnosis.    PAP Data:  Currently not on therapy   Mask type: mask fitting per DME  DME: [facility]           MEDICATIONS:   Current Outpatient Medications:   •  losartan (Cozaar) 50 MG tablet, Take 1 tablet by mouth Every Night., Disp: 30 tablet, Rfl: 0    PHYSICAL EXAM  Vitals:    05/23/22 1103   BP: (!) 148/101   Pulse: 58   SpO2: 95%           05/23/22  1103   Weight: (!) 170 kg (375 lb 1.6 oz)       Body mass index is 48.14 kg/m². Class 3 Severe Obesity (BMI >=40). Obesity-related health conditions include the following: obstructive sleep apnea. Obesity is unchanged. BMI is is above average; BMI management plan is completed. We discussed portion control and increasing exercise.      Gen:  No acute distress heard in voice, alert, oriented  Eyes:    Moist conjunctiva, EOMI   Lungs:  Normal effort, non-labored breathing  Heart:  No lower extremity edema   Psych:  Appropriate affect   Neuro:  Cn2-12 appear intact     Assessment and Plan:    1. Obstructive sleep apnea -  1. Start on APAP 5-15 cm h2O with mask fitting per DME and PAP supplies   2. Continue PAP as prescribed.   3. Monitor compliance report   4. Drowsy driving tips- do not drive if feeling sleepy   5. Return to clinic in 6 weeks with compliance report, or sooner if needed   2. Morbid obesity     The sleep results were discussed in detail with the patient.  The risks of untreated sleep apnea were reviewed.  Treatment options for sleep apnea  were discussed in detail. He is willing to pursue PAP therapy. I counseled patient on PAP management, maintenance, compliance, sleep hygiene, including regular sleep wake schedule and stimulus control therapy, the importance of weight reduction, safe driving and abstaining from smoking and alcohol consumption, as well as other sedatives.       All of the patient's questions were answered. He states understanding and agreement with my assessment and plan as above.     I obtained a brief history from the patient, reviewed the medical problems and current medications, and made medical decisions regarding treatment based on that information. Total visit time 15 min, with total of 10 minutes spent counseling patient regarding: PAP therapy, PAP compliance, PAP maintenance, Weight loss, Lifestyle modifications, Sleep hygiene and Study results.       Patient to follow up in 31-90 days with compliance report   He agrees to return sooner on a prn basis if sx change.           This document has been electronically signed by KESHIA Jones on May 23, 2022 11:22 CDT            CC: Oswaldo Stratton MD          No ref. provider found

## 2022-05-25 NOTE — TELEPHONE ENCOUNTER
PATIENT CALLED STATING A REFILL FOR PROMETHAZINE-CODEINE 6.25-10MG/ML SYRUP WAS NOT SENT TO THE PHARMACY. HE WANTS TO KNOW IF HE CAN GET A REFILL.     HE USES   [facility]    THANKS

## 2022-05-26 NOTE — TELEPHONE ENCOUNTER
Rx Refill Note  Requested Prescriptions     Pending Prescriptions Disp Refills   • promethazine-codeine (PHENERGAN with CODEINE) 6.25-10 MG/5ML solution       Sig: Take 5 mL by mouth Every 4 (Four) Hours As Needed for Cough.      Last office visit with prescribing clinician: 5/20/2022      Next office visit with prescribing clinician: 7/21/2022            Jake Read Rep  05/26/22, 08:04 CDT

## 2022-05-26 NOTE — TELEPHONE ENCOUNTER
Called pt to make him aware that Dr Stratton will be out of the office until Tuesday.     I also wanted to remind him that he has labs that need to be drawn.      ADÁN

## 2022-05-26 NOTE — TELEPHONE ENCOUNTER
Patient called in regards to missed call. Let patient know his message said Dr. Stratton was out until Tuesday and that his medication was waiting on approval from him. Also relayed that he had labs to complete and patient v/u stating he might come on Tuesday for them.

## 2022-05-27 RX ORDER — PROMETHAZINE HYDROCHLORIDE AND CODEINE PHOSPHATE 6.25; 1 MG/5ML; MG/5ML
5 SOLUTION ORAL EVERY 4 HOURS PRN
OUTPATIENT
Start: 2022-05-27

## 2022-05-29 PROBLEM — I82.90 DEEP VEIN THROMBOSIS (DVT) ASSOCIATED WITH COVID-19: Status: ACTIVE | Noted: 2022-05-29

## 2022-05-29 PROBLEM — E66.01 CLASS 3 SEVERE OBESITY DUE TO EXCESS CALORIES WITH SERIOUS COMORBIDITY AND BODY MASS INDEX (BMI) OF 45.0 TO 49.9 IN ADULT: Status: ACTIVE | Noted: 2022-05-29

## 2022-05-29 PROBLEM — U07.1 COVID-19 VIRUS INFECTION: Status: ACTIVE | Noted: 2022-05-29

## 2022-05-29 PROBLEM — I10 BENIGN ESSENTIAL HTN: Status: ACTIVE | Noted: 2022-05-29

## 2022-06-15 ENCOUNTER — LAB (OUTPATIENT)
Dept: LAB | Facility: HOSPITAL | Age: 32
End: 2022-06-15

## 2022-06-15 PROCEDURE — 80048 BASIC METABOLIC PNL TOTAL CA: CPT | Performed by: STUDENT IN AN ORGANIZED HEALTH CARE EDUCATION/TRAINING PROGRAM

## 2022-06-15 PROCEDURE — 86803 HEPATITIS C AB TEST: CPT | Performed by: STUDENT IN AN ORGANIZED HEALTH CARE EDUCATION/TRAINING PROGRAM

## 2022-06-16 LAB
ANION GAP SERPL CALCULATED.3IONS-SCNC: 11 MMOL/L (ref 5–15)
BUN SERPL-MCNC: 10 MG/DL (ref 6–20)
BUN/CREAT SERPL: 8 (ref 7–25)
CALCIUM SPEC-SCNC: 9.2 MG/DL (ref 8.6–10.5)
CHLORIDE SERPL-SCNC: 103 MMOL/L (ref 98–107)
CO2 SERPL-SCNC: 26 MMOL/L (ref 22–29)
CREAT SERPL-MCNC: 1.25 MG/DL (ref 0.76–1.27)
EGFRCR SERPLBLD CKD-EPI 2021: 79.9 ML/MIN/1.73
GLUCOSE SERPL-MCNC: 83 MG/DL (ref 65–99)
POTASSIUM SERPL-SCNC: 3.9 MMOL/L (ref 3.5–5.2)
SODIUM SERPL-SCNC: 140 MMOL/L (ref 136–145)

## 2022-06-17 LAB
HCV AB S/CO SERPL IA: <0.1 S/CO RATIO (ref 0–0.9)
HCV AB SERPL QL IA: NORMAL

## 2022-07-19 ENCOUNTER — TELEPHONE (OUTPATIENT)
Dept: FAMILY MEDICINE CLINIC | Facility: CLINIC | Age: 32
End: 2022-07-19

## 2022-07-19 NOTE — TELEPHONE ENCOUNTER
Called patient to reschedule appointment due to provider having a meeting. No answer. Left patient a detailed message to call our office back

## 2022-07-29 ENCOUNTER — OFFICE VISIT (OUTPATIENT)
Dept: SLEEP MEDICINE | Facility: HOSPITAL | Age: 32
End: 2022-07-29

## 2022-07-29 VITALS
OXYGEN SATURATION: 98 % | HEART RATE: 82 BPM | WEIGHT: 315 LBS | SYSTOLIC BLOOD PRESSURE: 171 MMHG | BODY MASS INDEX: 40.43 KG/M2 | DIASTOLIC BLOOD PRESSURE: 97 MMHG | HEIGHT: 74 IN

## 2022-07-29 DIAGNOSIS — G47.33 OSA (OBSTRUCTIVE SLEEP APNEA): Primary | ICD-10-CM

## 2022-07-29 PROCEDURE — 99212 OFFICE O/P EST SF 10 MIN: CPT | Performed by: NURSE PRACTITIONER

## 2022-07-29 NOTE — PROGRESS NOTES
Sleep Clinic Follow Up    Date: 2022  Primary Care Provider: Oswaldo Stratton MD    Last office visit: 2022 (I reviewed this note)    CC: Follow up: SUNDEEP started on CPAP, 31-90 day       Interim History:  Since the last visit:    1) moderate SUNDEEP -  Jomar Ahn has reportedly remained compliant with CPAP. He denies mask and machine issues, dry mouth, headaches, or pressures intolerance. He denies abnormal dreams, sleep paralysis, nasal congestion, URI sx.  Since starting on CPAP he reports less awakening during the night and feels more refreshed when he wakes up. Less daytime sleepiness.     Dropped his data card in water.     2) Patient denies RLS symptoms.     Sleep Testin. HST on 2022, AHI of 26   2. Currently on 5-15 cm H2O    PAP Data:  Card would not download  Mask type: Nasal pillows  [facility]     Bed time: 7146-9351  Sleep latency: 15-20 minutes  Number of times awakens during the night: 1-2  Wake time: 0400, 0800 on days off   Estimated total sleep time at night: 6-8 hours  Caffeine intake: 0 cups of coffee, 0 cups of tea, and 0 sodas per day  Alcohol intake: 0 drinks per week  Nap time: denies    Sleepiness with Driving: denies      [place] - 6, was 6 prior to CPAP        PMHx, FH, SH reviewed and pertinent changes are:  unchanged from last office visit on 2022      REVIEW OF SYSTEMS:   Negative for chest pain, SOA, fever, chills, cough, N/V/D, abdominal pain.    Smoking:none           Exam:  Vitals:    22 1410   BP: 171/97   Pulse: 82   SpO2: 98%           22  1410   Weight: (!) 173 kg (382 lb 1.9 oz)     Body mass index is 49.04 kg/m². Class 3 Severe Obesity (BMI >=40). Obesity-related health conditions include the following: obstructive sleep apnea. Obesity is unchanged. BMI is is above average; BMI management plan is completed. We discussed portion control and increasing exercise.      Gen:                No distress, conversant, pleasant, appears stated  age, alert, oriented  Eyes:               Anicteric sclera, moist conjunctiva, no lid lag                           EOMI   Lungs:             normal effort, non-labored breathing                          Clear to auscultation bilaterally          CV:                  Normal S1/S2, no murmur                          no lower extremity edema                 Psych:             Appropriate affect  Neuro:             CN 2-12 appear intact    Past Medical History:   Diagnosis Date   • Bronchitis, chronic (HCC)    • Deep vein thrombosis (HCC)        Current Outpatient Medications:   •  losartan (Cozaar) 50 MG tablet, Take 1 tablet by mouth Every Night., Disp: 30 tablet, Rfl: 0      Assessment and Plan:    1. Obstructive sleep apnea    1. Reportedly compliant and benefiting from PAP therapy-go to DME to get new SD card   2. Continue PAP as prescribed  3. Script for PAP supplies  4. Check compliance report in 30 days   5. Drowsy driving tips- do not drive if feeling sleepy   6. Return to clinic in 4 months with compliance report unless change in symptoms in interim period  2. Morbid obesity           I spent 12 minutes caring for Jomar on this date of service. This time includes time spent by me in the following activities: preparing for the visit, obtaining and/or reviewing a separately obtained history, performing a medically appropriate examination and/or evaluation, counseling and educating the patient/family/caregiver, ordering medications, tests, or procedures and documenting information in the medical record.           This document has been electronically signed by KESHIA Jones on July 29, 2022 14:20 CDT            CC: Oswaldo Stratton MD          No ref. provider found

## 2022-08-26 ENCOUNTER — OFFICE VISIT (OUTPATIENT)
Dept: FAMILY MEDICINE CLINIC | Facility: CLINIC | Age: 32
End: 2022-08-26

## 2022-08-26 VITALS
SYSTOLIC BLOOD PRESSURE: 171 MMHG | HEART RATE: 71 BPM | OXYGEN SATURATION: 97 % | DIASTOLIC BLOOD PRESSURE: 80 MMHG | TEMPERATURE: 97.9 F | WEIGHT: 315 LBS | BODY MASS INDEX: 47.87 KG/M2

## 2022-08-26 DIAGNOSIS — I10 PRIMARY HYPERTENSION: ICD-10-CM

## 2022-08-26 PROCEDURE — 99213 OFFICE O/P EST LOW 20 MIN: CPT | Performed by: STUDENT IN AN ORGANIZED HEALTH CARE EDUCATION/TRAINING PROGRAM

## 2022-08-26 RX ORDER — LOSARTAN POTASSIUM 50 MG/1
100 TABLET ORAL NIGHTLY
Qty: 90 TABLET | Refills: 3 | Status: SHIPPED | OUTPATIENT
Start: 2022-08-26 | End: 2022-08-26

## 2022-08-26 RX ORDER — LOSARTAN POTASSIUM 100 MG/1
100 TABLET ORAL NIGHTLY
Qty: 90 TABLET | Refills: 3 | Status: SHIPPED | OUTPATIENT
Start: 2022-08-26 | End: 2023-01-06 | Stop reason: SDUPTHER

## 2022-08-26 NOTE — PROGRESS NOTES
Subjective:  Jomar Ahn is a 31 y.o. male who presents for     Hypertension; patient states recent started on CPAP machine, has been tolerating machine well, states has noticed improvement.  Admits to blood pressure being elevated at home but improved from previous.  No issue with losartan 50 mg nightly, did take medication last night.  Has not been checking blood pressure at home regularly.  Denies any symptoms, chest pain, shortness of breath, headaches, vision changes, numbness, tingling, weakness, leg swelling, orthopnea.    Patient Active Problem List   Diagnosis   • Pain in joint of right shoulder   • SUNDEEP (obstructive sleep apnea)   • Benign essential HTN   • Deep vein thrombosis (DVT) associated with COVID-19   • Class 3 severe obesity due to excess calories with serious comorbidity and body mass index (BMI) of 45.0 to 49.9 in adult ([facility])     Vitals:    Vitals:    08/26/22 1102   BP: 171/80   Pulse: 71   Temp: 97.9 °F (36.6 °C)   SpO2: 97%   Weight: (!) 169 kg (373 lb)     Body mass index is 47.87 kg/m².      Current Outpatient Medications:   •  losartan (Cozaar) 100 MG tablet, Take 1 tablet by mouth Every Night., Disp: 90 tablet, Rfl: 3    Patient Active Problem List   Diagnosis   • Pain in joint of right shoulder   • SUNDEEP (obstructive sleep apnea)   • Benign essential HTN   • Deep vein thrombosis (DVT) associated with COVID-19   • Class 3 severe obesity due to excess calories with serious comorbidity and body mass index (BMI) of 45.0 to 49.9 in adult ([facility])     Past Surgical History:   Procedure Laterality Date   • KNEE SURGERY Bilateral     x2     Social History     Socioeconomic History   • Marital status: Single   Tobacco Use   • Smoking status: Never Smoker   • Smokeless tobacco: Never Used   Vaping Use   • Vaping Use: Never used   Substance and Sexual Activity   • Alcohol use: No   • Drug use: Never   • Sexual activity: Defer     History reviewed. No pertinent family history.  Office Visit on  05/20/2022   Component Date Value Ref Range Status   • Hepatitis C Ab 06/15/2022 <0.1  0.0 - 0.9 s/co ratio Final   • Glucose 06/15/2022 83  65 - 99 mg/dL Final   • BUN 06/15/2022 10  6 - 20 mg/dL Final   • Creatinine 06/15/2022 1.25  0.76 - 1.27 mg/dL Final   • Sodium 06/15/2022 140  136 - 145 mmol/L Final   • Potassium 06/15/2022 3.9  3.5 - 5.2 mmol/L Final   • Chloride 06/15/2022 103  98 - 107 mmol/L Final   • CO2 06/15/2022 26.0  22.0 - 29.0 mmol/L Final   • Calcium 06/15/2022 9.2  8.6 - 10.5 mg/dL Final   • BUN/Creatinine Ratio 06/15/2022 8.0  7.0 - 25.0 Final   • Anion Gap 06/15/2022 11.0  5.0 - 15.0 mmol/L Final   • eGFR 06/15/2022 79.9  >60.0 mL/min/1.73 Final    National Kidney Foundation and American Society of Nephrology (ASN) Task Force recommended calculation based on the Chronic Kidney Disease Epidemiology Collaboration (CKD-EPI) equation refit without adjustment for race.   • Interpretation 06/15/2022 Comment   Final    Negative  Not infected with HCV, unless recent infection is suspected or other  evidence exists to indicate HCV infection.   Hospital Outpatient Visit on 04/04/2022   Component Date Value Ref Range Status   • Target HR (85%) 04/04/2022 162  bpm Final   • Max. Pred. HR (100%) 04/04/2022 191  bpm Final      XR Chest 2 View  Narrative: TWO VIEW CHEST    HISTORY: Cough. Fever. Influenza A x1 week.    Frontal and lateral films of the chest were obtained.    COMPARISON: None    FINDINGS:    The lungs are clear of an acute process.  The heart is not enlarged.  The pulmonary vasculature is not increased.  No pleural effusion.  No pneumothorax.  No acute osseous abnormality.  Impression: CONCLUSION:  No Acute Disease    [id number]    Electronically signed by:  Tien Moon MD  5/13/2022 12:39 PM  CDT Workstation: [id number]      @[employer]@  Immunization History   Administered Date(s) Administered   • Td 08/24/2004     The following portions of the patient's history were reviewed and updated as  appropriate: allergies, current medications, past family history, past medical history, past social history, past surgical history and problem list.    PHQ-9 Total Score:             Physical Exam  Constitutional:       General: He is not in acute distress.     Appearance: He is obese.   HENT:      Head: Normocephalic and atraumatic.   Cardiovascular:      Rate and Rhythm: Normal rate and regular rhythm.      Heart sounds: Normal heart sounds. No murmur heard.  Pulmonary:      Effort: Pulmonary effort is normal.      Breath sounds: Normal breath sounds.   Abdominal:      Palpations: Abdomen is soft.      Tenderness: There is no abdominal tenderness.   Musculoskeletal:         General: No swelling.      Right lower leg: No edema.      Left lower leg: No edema.   Skin:     Coloration: Skin is not jaundiced.      Findings: No rash.   Neurological:      Mental Status: He is alert and oriented to person, place, and time. Mental status is at baseline.   Psychiatric:         Mood and Affect: Mood normal.         Behavior: Behavior normal.         Assessment & Plan    Diagnosis Plan   1. Primary hypertension  losartan (Cozaar) 100 MG tablet      No orders of the defined types were placed in this encounter.    Hypertension; uncontrolled but asymptomatic, reassuring.  Counseled on importance of blood pressure checks at home, patient to bring in blood pressure log to follow-up in 1 month, will increase to losartan 100 mg nightly, consider chlorthalidone, amlodipine at follow-up.          This document has been electronically signed by Oswaldo Stratton MD on August 26, 2022 13:31 CDT    EMR Dragon/Transciption Disclaimer: Some of this note may be an electronic transcription/translation of spoken language to printed text.  The electronic translation of spoken language may permit erroneous, or at times, nonsensical words or phrases to be inadvertently transcribed. Although I have reviewed the note for such errors, some may still  exist.

## 2022-11-21 ENCOUNTER — OFFICE VISIT (OUTPATIENT)
Dept: SLEEP MEDICINE | Facility: CLINIC | Age: 32
End: 2022-11-21

## 2022-11-21 VITALS
OXYGEN SATURATION: 96 % | SYSTOLIC BLOOD PRESSURE: 140 MMHG | HEART RATE: 102 BPM | BODY MASS INDEX: 40.43 KG/M2 | WEIGHT: 315 LBS | DIASTOLIC BLOOD PRESSURE: 100 MMHG | HEIGHT: 74 IN

## 2022-11-21 DIAGNOSIS — G47.33 OSA (OBSTRUCTIVE SLEEP APNEA): Primary | ICD-10-CM

## 2022-11-21 PROCEDURE — 99213 OFFICE O/P EST LOW 20 MIN: CPT | Performed by: NURSE PRACTITIONER

## 2022-11-21 NOTE — PROGRESS NOTES
Sleep Clinic Follow Up    Date: 2022  Primary Care Provider: Oswaldo Stratton MD    Last office visit: 2022 (I reviewed this note)    CC: Follow up: SUNDEEP started on CPAP      Interim History:  Since the last visit:    1) moderate SUNDEEP -  Jomar Ahn has reportedly remained compliant with CPAP. He denies mask and machine issues, dry mouth, headaches, or pressures intolerance. He denies abnormal dreams, sleep paralysis, nasal congestion, URI sx.  States uses every night and has no issues. Feels good morning after using machine.     He has been incarcerated for ~ 3 months. Did not use during that time. Has been home for the last 2 weeks and back on CPAP.     2) Patient denies RLS symptoms.     Sleep Testin. HST on 2022, AHI of 26   2. Currently on 5-15 cm H2O    PAP Data:  Card would not download   Mask type: Nasal pillows  DME: [facility]    Bed time: [id number]  Sleep latency: 15 minutes  Number of times awakens during the night: 1-2  Wake time: 0400, 0800 on days off  Estimated total sleep time at night: 6-8 hours  Caffeine intake: 0 cups of coffee, 0 cups of tea, and 0 sodas per day  Alcohol intake: 0 drinks per week  Nap time: occasionally    Sleepiness with Driving: denies      [place] - 8        PMHx, FH, SH reviewed and pertinent changes are: reports unchanged from last office visit on 2022      REVIEW OF SYSTEMS:   Negative for chest pain, SOA, fever, chills, cough, N/V/D, abdominal pain.    Smoking:none         Exam:  Vitals:    22 1413   BP: 140/100   Pulse: 102   SpO2: 96%           22  1413   Weight: (!) 169 kg (373 lb)     Body mass index is 47.87 kg/m². Class 3 Severe Obesity (BMI >=40). Obesity-related health conditions include the following: obstructive sleep apnea. Obesity is unchanged. BMI is is above average; BMI management plan is completed. We discussed portion control and increasing exercise.      Gen:                No distress, conversant, pleasant,  appears stated age, alert, oriented  Eyes:               Anicteric sclera, moist conjunctiva, no lid lag                           EOMI   Lungs:             normal effort, non-labored breathing                          Clear to auscultation bilaterally          CV:                  Normal S1/S2, no murmur                          no lower extremity edema                 Psych:             Appropriate affect  Neuro:             CN 2-12 appear intact    Past Medical History:   Diagnosis Date   • Bronchitis, chronic (HCC)    • Deep vein thrombosis (HCC)        Current Outpatient Medications:   •  losartan (Cozaar) 100 MG tablet, Take 1 tablet by mouth Every Night., Disp: 90 tablet, Rfl: 3      Assessment and Plan:    1. Obstructive sleep apnea    1. Reportedly compliant with PAP therapy- discussed health impact of sleep apnea.   2. Take data card to DME. Reach out to DME about getting modem for machine. Need compliance report for review   3. Continue PAP as prescribed  4. Script for PAP supplies  5. Drowsy driving tips- do not drive if feeling sleepy   6. Return to clinic in 6 months with compliance report unless change in symptoms in interim period  2. Morbid obesity           I spent 10 minutes caring for Jomar on this date of service. This time includes time spent by me in the following activities: preparing for the visit, obtaining and/or reviewing a separately obtained history, performing a medically appropriate examination and/or evaluation, counseling and educating the patient/family/caregiver, ordering medications, tests, or procedures and documenting information in the medical record.           This document has been electronically signed by KESHIA Jones on November 21, 2022 14:22 CST            CC: Oswaldo Stratton MD          No ref. provider found

## 2023-01-06 ENCOUNTER — OFFICE VISIT (OUTPATIENT)
Dept: FAMILY MEDICINE CLINIC | Facility: CLINIC | Age: 33
End: 2023-01-06
Payer: COMMERCIAL

## 2023-01-06 VITALS
HEART RATE: 93 BPM | SYSTOLIC BLOOD PRESSURE: 178 MMHG | WEIGHT: 315 LBS | HEIGHT: 74 IN | OXYGEN SATURATION: 98 % | DIASTOLIC BLOOD PRESSURE: 112 MMHG | TEMPERATURE: 98.9 F | BODY MASS INDEX: 40.43 KG/M2

## 2023-01-06 DIAGNOSIS — I10 PRIMARY HYPERTENSION: ICD-10-CM

## 2023-01-06 PROCEDURE — 99213 OFFICE O/P EST LOW 20 MIN: CPT | Performed by: STUDENT IN AN ORGANIZED HEALTH CARE EDUCATION/TRAINING PROGRAM

## 2023-01-06 RX ORDER — LOSARTAN POTASSIUM 100 MG/1
100 TABLET ORAL NIGHTLY
Qty: 90 TABLET | Refills: 3 | Status: SHIPPED | OUTPATIENT
Start: 2023-01-06

## 2023-01-06 NOTE — PATIENT INSTRUCTIONS
Hypertension, Adult  Hypertension is another name for high blood pressure. High blood pressure forces your heart to work harder to pump blood. This can cause problems over time.  There are two numbers in a blood pressure reading. There is a top number (systolic) over a bottom number (diastolic). It is best to have a blood pressure that is below 120/80.  What are the causes?  The cause of this condition is not known. Some other conditions can lead to high blood pressure.  What increases the risk?  Some lifestyle factors can make you more likely to develop high blood pressure:  Smoking.  Not getting enough exercise or physical activity.  Being overweight.  Having too much fat, sugar, calories, or salt (sodium) in your diet.  Drinking too much alcohol.  Other risk factors include:  Having any of these conditions:  Heart disease.  Diabetes.  High cholesterol.  Kidney disease.  Obstructive sleep apnea.  Having a family history of high blood pressure and high cholesterol.  Age. The risk increases with age.  Stress.  What are the signs or symptoms?  High blood pressure may not cause symptoms. Very high blood pressure (hypertensive crisis) may cause:  Headache.  Fast or uneven heartbeats (palpitations).  Shortness of breath.  Nosebleed.  Vomiting or feeling like you may vomit (nauseous).  Changes in how you see.  Very bad chest pain.  Feeling dizzy.  Seizures.  How is this treated?  This condition is treated by making healthy lifestyle changes, such as:  Eating healthy foods.  Exercising more.  Drinking less alcohol.  Your doctor may prescribe medicine if lifestyle changes do not help enough and if:  Your top number is above 130.  Your bottom number is above 80.  Your personal target blood pressure may vary.  Follow these instructions at home:  Eating and drinking    If told, follow the DASH eating plan. To follow this plan:  Fill one half of your plate at each meal with fruits and vegetables.  Fill one fourth of your plate  at each meal with whole grains. Whole grains include whole-wheat pasta, brown rice, and whole-grain bread.  Eat or drink low-fat dairy products, such as skim milk or low-fat yogurt.  Fill one fourth of your plate at each meal with low-fat (lean) proteins. Low-fat proteins include fish, chicken without skin, eggs, beans, and tofu.  Avoid fatty meat, cured and processed meat, or chicken with skin.  Avoid pre-made or processed food.  Limit the amount of salt in your diet to less than 1,500 mg each day.  Do not drink alcohol if:  Your doctor tells you not to drink.  You are pregnant, may be pregnant, or are planning to become pregnant.  If you drink alcohol:  Limit how much you have to:  0-1 drink a day for women.  0-2 drinks a day for men.  Know how much alcohol is in your drink. In the [place] one drink equals one 12 oz bottle of beer (355 mL), one 5 oz glass of wine (148 mL), or one 1½ oz glass of hard liquor (44 mL).  Lifestyle    Work with your doctor to stay at a healthy weight or to lose weight. Ask your doctor what the best weight is for you.  Get at least 30 minutes of exercise that causes your heart to beat faster (aerobic exercise) most days of the week. This may include walking, swimming, or biking.  Get at least 30 minutes of exercise that strengthens your muscles (resistance exercise) at least 3 days a week. This may include lifting weights or doing Pilates.  Do not smoke or use any products that contain nicotine or tobacco. If you need help quitting, ask your doctor.  Check your blood pressure at home as told by your doctor.  Keep all follow-up visits.  Medicines  Take over-the-counter and prescription medicines only as told by your doctor. Follow directions carefully.  Do not skip doses of blood pressure medicine. The medicine does not work as well if you skip doses. Skipping doses also puts you at risk for problems.  Ask your doctor about side effects or reactions to medicines that you should watch  for.  Contact a doctor if:  You think you are having a reaction to the medicine you are taking.  You have headaches that keep coming back.  You feel dizzy.  You have swelling in your ankles.  You have trouble with your vision.  Get help right away if:  You get a very bad headache.  You start to feel mixed up (confused).  You feel weak or numb.  You feel faint.  You have very bad pain in your:  Chest.  Belly (abdomen).  You vomit more than once.  You have trouble breathing.  These symptoms may be an emergency. Get help right away. Call 911.  Do not wait to see if the symptoms will go away.  Do not drive yourself to the hospital.  Summary  Hypertension is another name for high blood pressure.  High blood pressure forces your heart to work harder to pump blood.  For most people, a normal blood pressure is less than 120/80.  Making healthy choices can help lower blood pressure. If your blood pressure does not get lower with healthy choices, you may need to take medicine.  This information is not intended to replace advice given to you by your health care provider. Make sure you discuss any questions you have with your health care provider.  Document Revised: 10/06/2022 Document Reviewed: 10/06/2022  Elsevier Patient Education © 2022 [employer]

## 2023-01-06 NOTE — PROGRESS NOTES
Subjective:  Jomar Ahn is a 32 y.o. male who presents for     Hypertension; currently on losartan 100mg daily.  States that blood pressure has been uncontrolled, ran out of medication and did not know there was any refills.  Does not have any issues with medications. Denies any cardiac symptoms, chest pain, shortness of breath, headaches, vision changes, numbness, tingling, weakness, leg swelling, orthopnea, dyspnea on exertion. In addition, has been compliant to CPAP machine, states that it has been helping him with sleep and waking up more refreshed.       Patient Active Problem List   Diagnosis   • Pain in joint of right shoulder   • SUNDEEP (obstructive sleep apnea)   • Benign essential HTN   • Deep vein thrombosis (DVT) associated with COVID-19   • Class 3 severe obesity due to excess calories with serious comorbidity and body mass index (BMI) of 45.0 to 49.9 in adult ([facility])     Vitals:    Vitals:    01/06/23 1628   BP: (!) 178/112   BP Location: Left arm   Patient Position: Sitting   Cuff Size: Large Adult   Pulse: 93   Temp: 98.9 °F (37.2 °C)   SpO2: 98%   Weight: (!) 174 kg (383 lb)   Height: 188 cm (74\")     Body mass index is 49.17 kg/m².      Current Outpatient Medications:   •  losartan (Cozaar) 100 MG tablet, Take 1 tablet by mouth Every Night., Disp: 90 tablet, Rfl: 3    Patient Active Problem List   Diagnosis   • Pain in joint of right shoulder   • SUNDEEP (obstructive sleep apnea)   • Benign essential HTN   • Deep vein thrombosis (DVT) associated with COVID-19   • Class 3 severe obesity due to excess calories with serious comorbidity and body mass index (BMI) of 45.0 to 49.9 in adult ([facility])     Past Surgical History:   Procedure Laterality Date   • KNEE SURGERY Bilateral     x2     Social History     Socioeconomic History   • Marital status: Single   Tobacco Use   • Smoking status: Never   • Smokeless tobacco: Never   Vaping Use   • Vaping Use: Never used   Substance and Sexual Activity   • Alcohol use:  No   • Drug use: Never   • Sexual activity: Defer     History reviewed. No pertinent family history.  No visits with results within 6 Month(s) from this visit.   Latest known visit with results is:   Office Visit on 05/20/2022   Component Date Value Ref Range Status   • Hepatitis C Ab 06/15/2022 <0.1  0.0 - 0.9 s/co ratio Final   • Glucose 06/15/2022 83  65 - 99 mg/dL Final   • BUN 06/15/2022 10  6 - 20 mg/dL Final   • Creatinine 06/15/2022 1.25  0.76 - 1.27 mg/dL Final   • Sodium 06/15/2022 140  136 - 145 mmol/L Final   • Potassium 06/15/2022 3.9  3.5 - 5.2 mmol/L Final   • Chloride 06/15/2022 103  98 - 107 mmol/L Final   • CO2 06/15/2022 26.0  22.0 - 29.0 mmol/L Final   • Calcium 06/15/2022 9.2  8.6 - 10.5 mg/dL Final   • BUN/Creatinine Ratio 06/15/2022 8.0  7.0 - 25.0 Final   • Anion Gap 06/15/2022 11.0  5.0 - 15.0 mmol/L Final   • eGFR 06/15/2022 79.9  >60.0 mL/min/1.73 Final    National Kidney Foundation and American Society of Nephrology (ASN) Task Force recommended calculation based on the Chronic Kidney Disease Epidemiology Collaboration (CKD-EPI) equation refit without adjustment for race.   • Interpretation 06/15/2022 Comment   Final    Negative  Not infected with HCV, unless recent infection is suspected or other  evidence exists to indicate HCV infection.      XR Chest 2 View  Narrative: TWO VIEW CHEST    HISTORY: Cough. Fever. Influenza A x1 week.    Frontal and lateral films of the chest were obtained.    COMPARISON: None    FINDINGS:    The lungs are clear of an acute process.  The heart is not enlarged.  The pulmonary vasculature is not increased.  No pleural effusion.  No pneumothorax.  No acute osseous abnormality.  Impression: CONCLUSION:  No Acute Disease    [id number]    Electronically signed by:  Tien Moon MD  5/13/2022 12:39 PM  CDT Workstation: [id number]      @[email or web address]@  Immunization History   Administered Date(s) Administered   • Td 08/24/2004     The following portions of the patient's  history were reviewed and updated as appropriate: allergies, current medications, past family history, past medical history, past social history, past surgical history and problem list.    PHQ-9 Total Score: 0         Physical Exam  Constitutional:       General: He is not in acute distress.  HENT:      Head: Normocephalic and atraumatic.   Cardiovascular:      Rate and Rhythm: Normal rate and regular rhythm.      Heart sounds: Normal heart sounds. No murmur heard.  Pulmonary:      Effort: Pulmonary effort is normal.      Breath sounds: Normal breath sounds.   Abdominal:      Palpations: Abdomen is soft.      Tenderness: There is no abdominal tenderness.   Musculoskeletal:         General: No swelling.      Right lower leg: No edema.      Left lower leg: No edema.   Skin:     Coloration: Skin is not jaundiced.      Findings: No rash.   Neurological:      Mental Status: He is alert and oriented to person, place, and time. Mental status is at baseline.   Psychiatric:         Mood and Affect: Mood normal.         Behavior: Behavior normal.         Assessment & Plan    Diagnosis Plan   1. Primary hypertension  losartan (Cozaar) 100 MG tablet         No orders of the defined types were placed in this encounter.    Hypertension; does not have issues with current medications, blood pressure has been uncontrolled due to medication nonadherence.  Discussed importance of blood pressure control, risk of uncontrolled hypertension, continue to work on diet, exercise, weight loss.  After discussion, will refill losartan, discussed importance of medication adherence, risks of uncontrolled hypertension.  Follow-up in 1 month with blood pressure log or sooner if needed.  Given strict ER/cardiac precautions.              This document has been electronically signed by Oswaldo Stratton MD on January 6, 2023 17:00 CST    EMR Dragon/Transciption Disclaimer: Some of this note may be an electronic transcription/translation of spoken language  to printed text.  The electronic translation of spoken language may permit erroneous, or at times, nonsensical words or phrases to be inadvertently transcribed. Although I have reviewed the note for such errors, some may still exist.

## 2023-02-10 ENCOUNTER — OFFICE VISIT (OUTPATIENT)
Dept: FAMILY MEDICINE CLINIC | Facility: CLINIC | Age: 33
End: 2023-02-10
Payer: COMMERCIAL

## 2023-02-10 VITALS
BODY MASS INDEX: 40.43 KG/M2 | OXYGEN SATURATION: 98 % | SYSTOLIC BLOOD PRESSURE: 146 MMHG | TEMPERATURE: 97.2 F | RESPIRATION RATE: 18 BRPM | WEIGHT: 315 LBS | DIASTOLIC BLOOD PRESSURE: 88 MMHG | HEIGHT: 74 IN | HEART RATE: 78 BPM

## 2023-02-10 DIAGNOSIS — I10 PRIMARY HYPERTENSION: Primary | ICD-10-CM

## 2023-02-10 DIAGNOSIS — Z13.220 LIPID SCREENING: ICD-10-CM

## 2023-02-10 PROCEDURE — 99213 OFFICE O/P EST LOW 20 MIN: CPT | Performed by: STUDENT IN AN ORGANIZED HEALTH CARE EDUCATION/TRAINING PROGRAM

## 2023-02-10 RX ORDER — AMLODIPINE BESYLATE 5 MG/1
5 TABLET ORAL DAILY
Qty: 30 TABLET | Refills: 1 | Status: SHIPPED | OUTPATIENT
Start: 2023-02-10

## 2023-02-10 NOTE — PROGRESS NOTES
"Subjective:  Jomar Ahn is a 32 y.o. male who presents for     Hypertension; currently on Losartan 100mg daily.  States that blood pressure has been slightly elevated.  Does not have any issues with medications. Denies any cardiac symptoms, chest pain, shortness of breath, headaches, vision changes, numbness, tingling, weakness, leg swelling, orthopnea, dyspnea on exertion.    Patient Active Problem List   Diagnosis   • Pain in joint of right shoulder   • SUNDEEP (obstructive sleep apnea)   • Benign essential HTN   • Deep vein thrombosis (DVT) associated with COVID-19   • Class 3 severe obesity due to excess calories with serious comorbidity and body mass index (BMI) of 45.0 to 49.9 in adult ([facility])     Vitals:    Vitals:    02/10/23 1619   BP: 146/88   Pulse: 78   Resp: 18   Temp: 97.2 °F (36.2 °C)   TempSrc: Oral   SpO2: 98%   Weight: (!) 174 kg (384 lb 8 oz)   Height: 188 cm (74\")     Body mass index is 49.37 kg/m².      Current Outpatient Medications:   •  losartan (Cozaar) 100 MG tablet, Take 1 tablet by mouth Every Night., Disp: 90 tablet, Rfl: 3  •  amLODIPine (NORVASC) 5 MG tablet, Take 1 tablet by mouth Daily., Disp: 30 tablet, Rfl: 1    Patient Active Problem List   Diagnosis   • Pain in joint of right shoulder   • SUNDEEP (obstructive sleep apnea)   • Benign essential HTN   • Deep vein thrombosis (DVT) associated with COVID-19   • Class 3 severe obesity due to excess calories with serious comorbidity and body mass index (BMI) of 45.0 to 49.9 in adult ([facility])     Past Surgical History:   Procedure Laterality Date   • KNEE SURGERY Bilateral     x2     Social History     Socioeconomic History   • Marital status: Single   Tobacco Use   • Smoking status: Never   • Smokeless tobacco: Never   Vaping Use   • Vaping Use: Never used   Substance and Sexual Activity   • Alcohol use: No   • Drug use: Never   • Sexual activity: Defer     History reviewed. No pertinent family history.  No visits with results within 6 Month(s) " from this visit.   Latest known visit with results is:   Office Visit on 05/20/2022   Component Date Value Ref Range Status   • Hepatitis C Ab 06/15/2022 <0.1  0.0 - 0.9 s/co ratio Final   • Glucose 06/15/2022 83  65 - 99 mg/dL Final   • BUN 06/15/2022 10  6 - 20 mg/dL Final   • Creatinine 06/15/2022 1.25  0.76 - 1.27 mg/dL Final   • Sodium 06/15/2022 140  136 - 145 mmol/L Final   • Potassium 06/15/2022 3.9  3.5 - 5.2 mmol/L Final   • Chloride 06/15/2022 103  98 - 107 mmol/L Final   • CO2 06/15/2022 26.0  22.0 - 29.0 mmol/L Final   • Calcium 06/15/2022 9.2  8.6 - 10.5 mg/dL Final   • BUN/Creatinine Ratio 06/15/2022 8.0  7.0 - 25.0 Final   • Anion Gap 06/15/2022 11.0  5.0 - 15.0 mmol/L Final   • eGFR 06/15/2022 79.9  >60.0 mL/min/1.73 Final    National Kidney Foundation and American Society of Nephrology (ASN) Task Force recommended calculation based on the Chronic Kidney Disease Epidemiology Collaboration (CKD-EPI) equation refit without adjustment for race.   • Interpretation 06/15/2022 Comment   Final    Negative  Not infected with HCV, unless recent infection is suspected or other  evidence exists to indicate HCV infection.      XR Chest 2 View  Narrative: TWO VIEW CHEST    HISTORY: Cough. Fever. Influenza A x1 week.    Frontal and lateral films of the chest were obtained.    COMPARISON: None    FINDINGS:    The lungs are clear of an acute process.  The heart is not enlarged.  The pulmonary vasculature is not increased.  No pleural effusion.  No pneumothorax.  No acute osseous abnormality.  Impression: CONCLUSION:  No Acute Disease    [id number]    Electronically signed by:  Tien Moon MD  5/13/2022 12:39 PM  CDT Workstation: [id number]      @[email or web address]@  Immunization History   Administered Date(s) Administered   • Td 08/24/2004     The following portions of the patient's history were reviewed and updated as appropriate: allergies, current medications, past family history, past medical history, past social history,  past surgical history and problem list.    PHQ-9 Total Score:             Physical Exam  Constitutional:       General: He is not in acute distress.     Appearance: He is obese.   HENT:      Head: Normocephalic and atraumatic.   Cardiovascular:      Rate and Rhythm: Normal rate and regular rhythm.      Heart sounds: Normal heart sounds. No murmur heard.  Pulmonary:      Effort: Pulmonary effort is normal.      Breath sounds: Normal breath sounds.   Abdominal:      Palpations: Abdomen is soft.      Tenderness: There is no abdominal tenderness.   Musculoskeletal:         General: No swelling.      Right lower leg: No edema.      Left lower leg: No edema.   Skin:     Coloration: Skin is not jaundiced.      Findings: No rash.   Neurological:      Mental Status: He is alert and oriented to person, place, and time. Mental status is at baseline.   Psychiatric:         Mood and Affect: Mood normal.         Behavior: Behavior normal.         Assessment & Plan    Diagnosis Plan   1. Primary hypertension  amLODIPine (NORVASC) 5 MG tablet    CBC & Differential    Comprehensive Metabolic Panel      2. Lipid screening  Lipid Panel         Orders Placed This Encounter   Procedures   • Comprehensive Metabolic Panel     Order Specific Question:   Release to patient     Answer:   Immediate   • Lipid Panel   • CBC & Differential     Order Specific Question:   Manual Differential     Answer:   No     Hypertension; does not have issues with current medications, blood pressure has been uncontrolled.  Discussed importance of blood pressure control, risk of uncontrolled hypertension, continue to work on diet, exercise, weight loss.  After discussion, will add on amlodipine, obtain labs as above.  Follow-up in 1 month with blood pressure log or sooner if needed.  Given strict ER/cardiac precautions.            This document has been electronically signed by Oswaldo Stratton MD on February 24, 2023 23:57 CST    EMR Dragon/Radhika  Disclaimer: Some of this note may be an electronic transcription/translation of spoken language to printed text.  The electronic translation of spoken language may permit erroneous, or at times, nonsensical words or phrases to be inadvertently transcribed. Although I have reviewed the note for such errors, some may still exist.

## 2023-04-18 ENCOUNTER — TELEPHONE (OUTPATIENT)
Dept: FAMILY MEDICINE CLINIC | Facility: CLINIC | Age: 33
End: 2023-04-18

## 2023-04-18 NOTE — TELEPHONE ENCOUNTER
Tried calling to let patient know that appointment this afternoon has been cancelled and will need to call the office to reschedule

## 2023-07-31 ENCOUNTER — OFFICE VISIT (OUTPATIENT)
Dept: FAMILY MEDICINE CLINIC | Facility: CLINIC | Age: 33
End: 2023-07-31

## 2023-07-31 VITALS
SYSTOLIC BLOOD PRESSURE: 138 MMHG | BODY MASS INDEX: 40.43 KG/M2 | DIASTOLIC BLOOD PRESSURE: 90 MMHG | TEMPERATURE: 98.3 F | HEIGHT: 74 IN | WEIGHT: 315 LBS | OXYGEN SATURATION: 97 % | HEART RATE: 92 BPM

## 2023-07-31 DIAGNOSIS — I10 PRIMARY HYPERTENSION: Primary | ICD-10-CM

## 2023-07-31 DIAGNOSIS — Z13.220 LIPID SCREENING: ICD-10-CM

## 2023-07-31 PROCEDURE — 99213 OFFICE O/P EST LOW 20 MIN: CPT | Performed by: STUDENT IN AN ORGANIZED HEALTH CARE EDUCATION/TRAINING PROGRAM

## 2023-07-31 RX ORDER — AMLODIPINE BESYLATE 10 MG/1
10 TABLET ORAL DAILY
Qty: 90 TABLET | Refills: 3 | Status: SHIPPED | OUTPATIENT
Start: 2023-07-31

## 2023-07-31 NOTE — PATIENT INSTRUCTIONS
Hypertension, Adult  Hypertension is another name for high blood pressure. High blood pressure forces your heart to work harder to pump blood. This can cause problems over time.  There are two numbers in a blood pressure reading. There is a top number (systolic) over a bottom number (diastolic). It is best to have a blood pressure that is below 120/80.  What are the causes?  The cause of this condition is not known. Some other conditions can lead to high blood pressure.  What increases the risk?  Some lifestyle factors can make you more likely to develop high blood pressure:  Smoking.  Not getting enough exercise or physical activity.  Being overweight.  Having too much fat, sugar, calories, or salt (sodium) in your diet.  Drinking too much alcohol.  Other risk factors include:  Having any of these conditions:  Heart disease.  Diabetes.  High cholesterol.  Kidney disease.  Obstructive sleep apnea.  Having a family history of high blood pressure and high cholesterol.  Age. The risk increases with age.  Stress.  What are the signs or symptoms?  High blood pressure may not cause symptoms. Very high blood pressure (hypertensive crisis) may cause:  Headache.  Fast or uneven heartbeats (palpitations).  Shortness of breath.  Nosebleed.  Vomiting or feeling like you may vomit (nauseous).  Changes in how you see.  Very bad chest pain.  Feeling dizzy.  Seizures.  How is this treated?  This condition is treated by making healthy lifestyle changes, such as:  Eating healthy foods.  Exercising more.  Drinking less alcohol.  Your doctor may prescribe medicine if lifestyle changes do not help enough and if:  Your top number is above 130.  Your bottom number is above 80.  Your personal target blood pressure may vary.  Follow these instructions at home:  Eating and drinking    If told, follow the DASH eating plan. To follow this plan:  Fill one half of your plate at each meal with fruits and vegetables.  Fill one fourth of your plate  at each meal with whole grains. Whole grains include whole-wheat pasta, brown rice, and whole-grain bread.  Eat or drink low-fat dairy products, such as skim milk or low-fat yogurt.  Fill one fourth of your plate at each meal with low-fat (lean) proteins. Low-fat proteins include fish, chicken without skin, eggs, beans, and tofu.  Avoid fatty meat, cured and processed meat, or chicken with skin.  Avoid pre-made or processed food.  Limit the amount of salt in your diet to less than 1,500 mg each day.  Do not drink alcohol if:  Your doctor tells you not to drink.  You are pregnant, may be pregnant, or are planning to become pregnant.  If you drink alcohol:  Limit how much you have to:  0-1 drink a day for women.  0-2 drinks a day for men.  Know how much alcohol is in your drink. In the [place] one drink equals one 12 oz bottle of beer (355 mL), one 5 oz glass of wine (148 mL), or one 1« oz glass of hard liquor (44 mL).  Lifestyle    Work with your doctor to stay at a healthy weight or to lose weight. Ask your doctor what the best weight is for you.  Get at least 30 minutes of exercise that causes your heart to beat faster (aerobic exercise) most days of the week. This may include walking, swimming, or biking.  Get at least 30 minutes of exercise that strengthens your muscles (resistance exercise) at least 3 days a week. This may include lifting weights or doing Pilates.  Do not smoke or use any products that contain nicotine or tobacco. If you need help quitting, ask your doctor.  Check your blood pressure at home as told by your doctor.  Keep all follow-up visits.  Medicines  Take over-the-counter and prescription medicines only as told by your doctor. Follow directions carefully.  Do not skip doses of blood pressure medicine. The medicine does not work as well if you skip doses. Skipping doses also puts you at risk for problems.  Ask your doctor about side effects or reactions to medicines that you should watch  for.  Contact a doctor if:  You think you are having a reaction to the medicine you are taking.  You have headaches that keep coming back.  You feel dizzy.  You have swelling in your ankles.  You have trouble with your vision.  Get help right away if:  You get a very bad headache.  You start to feel mixed up (confused).  You feel weak or numb.  You feel faint.  You have very bad pain in your:  Chest.  Belly (abdomen).  You vomit more than once.  You have trouble breathing.  These symptoms may be an emergency. Get help right away. Call 911.  Do not wait to see if the symptoms will go away.  Do not drive yourself to the hospital.  Summary  Hypertension is another name for high blood pressure.  High blood pressure forces your heart to work harder to pump blood.  For most people, a normal blood pressure is less than 120/80.  Making healthy choices can help lower blood pressure. If your blood pressure does not get lower with healthy choices, you may need to take medicine.  This information is not intended to replace advice given to you by your health care provider. Make sure you discuss any questions you have with your health care provider.  Document Revised: 10/06/2022 Document Reviewed: 10/06/2022  [employer] Patient Education c 2023 [employer]

## 2023-08-15 NOTE — PROGRESS NOTES
"Subjective:  Jomar Ahn is a 32 y.o. male who presents for     Hypertension; currently on losartan 100mg daily, amlodipine 5mg daily.  States that blood pressure has been slightly elevated.  Does not have any issues with medications. Denies any cardiac symptoms, chest pain, shortness of breath, headaches, vision changes, numbness, tingling, weakness, leg swelling, orthopnea, dyspnea on exertion.    Patient Active Problem List   Diagnosis    Pain in joint of right shoulder    SUNDEEP (obstructive sleep apnea)    Benign essential HTN    Deep vein thrombosis (DVT) associated with COVID-19    Class 3 severe obesity due to excess calories with serious comorbidity and body mass index (BMI) of 45.0 to 49.9 in adult     Vitals:    Vitals:    07/31/23 1559   BP: 138/90   Pulse: 92   Temp: 98.3 øF (36.8 øC)   TempSrc: Oral   SpO2: 97%   Weight: (!) 178 kg (392 lb 8 oz)   Height: 188 cm (74\")     Body mass index is 50.39 kg/mý.      Current Outpatient Medications:     losartan (Cozaar) 100 MG tablet, Take 1 tablet by mouth Every Night., Disp: 90 tablet, Rfl: 3    amLODIPine (NORVASC) 10 MG tablet, Take 1 tablet by mouth Daily., Disp: 90 tablet, Rfl: 3    Patient Active Problem List   Diagnosis    Pain in joint of right shoulder    SUNDEEP (obstructive sleep apnea)    Benign essential HTN    Deep vein thrombosis (DVT) associated with COVID-19    Class 3 severe obesity due to excess calories with serious comorbidity and body mass index (BMI) of 45.0 to 49.9 in adult     Past Surgical History:   Procedure Laterality Date    KNEE SURGERY Bilateral     x2     Social History     Socioeconomic History    Marital status: Single   Tobacco Use    Smoking status: Never    Smokeless tobacco: Never   Vaping Use    Vaping Use: Never used   Substance and Sexual Activity    Alcohol use: No    Drug use: Never    Sexual activity: Defer     History reviewed. No pertinent family history.  No visits with results within 6 Month(s) from this visit. "   Latest known visit with results is:   Office Visit on 05/20/2022   Component Date Value Ref Range Status    Hepatitis C Ab 06/15/2022 <0.1  0.0 - 0.9 s/co ratio Final    Glucose 06/15/2022 83  65 - 99 mg/dL Final    BUN 06/15/2022 10  6 - 20 mg/dL Final    Creatinine 06/15/2022 1.25  0.76 - 1.27 mg/dL Final    Sodium 06/15/2022 140  136 - 145 mmol/L Final    Potassium 06/15/2022 3.9  3.5 - 5.2 mmol/L Final    Chloride 06/15/2022 103  98 - 107 mmol/L Final    CO2 06/15/2022 26.0  22.0 - 29.0 mmol/L Final    Calcium 06/15/2022 9.2  8.6 - 10.5 mg/dL Final    BUN/Creatinine Ratio 06/15/2022 8.0  7.0 - 25.0 Final    Anion Gap 06/15/2022 11.0  5.0 - 15.0 mmol/L Final    eGFR 06/15/2022 79.9  >60.0 mL/min/1.73 Final    National Kidney Foundation and American Society of Nephrology (ASN) Task Force recommended calculation based on the Chronic Kidney Disease Epidemiology Collaboration (CKD-EPI) equation refit without adjustment for race.    Interpretation 06/15/2022 Comment   Final    Negative  Not infected with HCV, unless recent infection is suspected or other  evidence exists to indicate HCV infection.      XR Chest 2 View  Narrative: TWO VIEW CHEST    HISTORY: Cough. Fever. Influenza A x1 week.    Frontal and lateral films of the chest were obtained.    COMPARISON: None    FINDINGS:    The lungs are clear of an acute process.  The heart is not enlarged.  The pulmonary vasculature is not increased.  No pleural effusion.  No pneumothorax.  No acute osseous abnormality.  Impression: CONCLUSION:  No Acute Disease    [id number]    Electronically signed by:  Tien Moon MD  5/13/2022 12:39 PM  CDT Workstation: [id number]      @TimeLab@  Immunization History   Administered Date(s) Administered    Td (TDVAX) 08/24/2004     The following portions of the patient's history were reviewed and updated as appropriate: allergies, current medications, past family history, past medical history, past social history, past surgical history  and problem list.    PHQ-9 Total Score:             Physical Exam  Constitutional:       General: He is not in acute distress.  HENT:      Head: Normocephalic and atraumatic.   Cardiovascular:      Rate and Rhythm: Normal rate and regular rhythm.      Heart sounds: Normal heart sounds. No murmur heard.  Pulmonary:      Effort: Pulmonary effort is normal.      Breath sounds: Normal breath sounds.   Abdominal:      Palpations: Abdomen is soft.      Tenderness: There is no abdominal tenderness.   Musculoskeletal:         General: No swelling.      Right lower leg: No edema.      Left lower leg: No edema.   Skin:     Coloration: Skin is not jaundiced.      Findings: No rash.   Neurological:      Mental Status: He is alert and oriented to person, place, and time. Mental status is at baseline.   Psychiatric:         Mood and Affect: Mood normal.         Behavior: Behavior normal.       Assessment & Plan    Diagnosis Plan   1. Primary hypertension  amLODIPine (NORVASC) 10 MG tablet    CBC & Differential    Comprehensive Metabolic Panel      2. Lipid screening  Lipid Panel         Orders Placed This Encounter   Procedures    Comprehensive Metabolic Panel    Lipid Panel     Order Specific Question:   Release to patient     Answer:   Routine Release    CBC & Differential     Order Specific Question:   Manual Differential     Answer:   No     Order Specific Question:   Release to patient     Answer:   Routine Release     Hypertension; does not have issues with current medications, blood pressure has been uncontrolled.  Discussed importance of blood pressure control, risk of uncontrolled hypertension, continue to work on diet, exercise, weight loss.  After discussion, will increase amlodipine to 10mg daily, obtain labs as above, treat as indicated.  Follow-up in 3 months with blood pressure log or sooner if needed.  Given strict ER/cardiac precautions.            This document has been electronically signed by Oswaldo Stratton MD  on August 14, 2023 21:35 CDT    EMR Dragon/Transciption Disclaimer: Some of this note may be an electronic transcription/translation of spoken language to printed text.  The electronic translation of spoken language may permit erroneous, or at times, nonsensical words or phrases to be inadvertently transcribed. Although I have reviewed the note for such errors, some may still exist.

## 2024-04-01 NOTE — PROGRESS NOTES
4/4/2022    Jomar Ahn  1990    Chief Complaint:    Chief Complaint   Patient presents with   • Deep Vein Thrombosis       HPI:      PCP:  Oswaldo Stratton MD     31 y.o. male with HTN(stable, increased risk stroke, rupture) and Obesity(uncontrolled, increased risk cardiovascular events) , sleep apnea(stable, increase risk cardiovascular events), covid19(resolved), DVT(new, increase risk venous stasis).  never smoked.  Moderate viral illness, covid19 8/2021.  Developed DVT 9/2021. On eliquis, swelling improved.  No no prior or family history blood clots or clotting disorders..  No TIA stroke amaurosis.  No MI claudication. No other associated signs, symptoms or modifying factors.    8/2021 covid19  9/2021 RIGHT popliteal DVT  10/2021 RIGHT popliteal DVT  4/2022 Lower extremity venous duplex:  RIGHT no dvt, no reflux.  LEFT no dvt, no reflux.  Tibial veins difficult to image.    The following portions of the patient's history were reviewed and updated as appropriate: allergies, current medications, past family history, past medical history, past social history, past surgical history and problem list.  Recent images independently reviewed.  Available laboratory values reviewed.    PMH:  Past Medical History:   Diagnosis Date   • Bronchitis, chronic (HCC)    • Deep vein thrombosis (HCC)      Past Surgical History:   Procedure Laterality Date   • KNEE SURGERY Bilateral     x2     No family history on file.  Social History     Tobacco Use   • Smoking status: Never Smoker   • Smokeless tobacco: Never Used   Vaping Use   • Vaping Use: Never used   Substance Use Topics   • Alcohol use: No   • Drug use: Never       ALLERGIES:  No Known Allergies      MEDICATIONS:    Current Outpatient Medications:   •  losartan (Cozaar) 50 MG tablet, Take 1 tablet by mouth Every Night., Disp: 30 tablet, Rfl: 0    Review of Systems   Review of Systems   Constitutional: Negative for malaise/fatigue and weight loss.  "  Cardiovascular: Positive for leg swelling. Negative for chest pain, claudication and dyspnea on exertion.   Respiratory: Negative for cough and shortness of breath.    Skin: Negative for color change and poor wound healing.   Neurological: Negative for dizziness, numbness and weakness.       Physical Exam   Vitals:    22 1403 22 1404   BP: 152/86 148/88   BP Location: Left arm Right arm   Pulse: 99    Temp: 98.4 °F (36.9 °C)    TempSrc: Infrared    SpO2: 97%    Weight: (!) 179 kg (394 lb)    Height: 188 cm (74\")      Body surface area is 2.9 meters squared.  Body mass index is 50.59 kg/m².  Physical Exam  Constitutional:       General: He is not in acute distress.     Appearance: He is not ill-appearing.   HENT:      Right Ear: Hearing normal.      Left Ear: Hearing normal.      Nose: No nasal deformity.      Mouth/Throat:      Dentition: Normal dentition. Does not have dentures.   Cardiovascular:      Rate and Rhythm: Normal rate and regular rhythm.      Pulses:           Carotid pulses are 2+ on the right side and 2+ on the left side.       Radial pulses are 2+ on the right side and 2+ on the left side.        Dorsalis pedis pulses are 2+ on the right side and 2+ on the left side.        Posterior tibial pulses are 2+ on the right side and 2+ on the left side.      Heart sounds: No murmur heard.  Pulmonary:      Effort: Pulmonary effort is normal.      Breath sounds: Normal breath sounds.   Abdominal:      General: There is no distension.      Palpations: Abdomen is soft. There is no mass.      Tenderness: There is no abdominal tenderness.   Musculoskeletal:         General: No deformity.      Right lower le+ Edema present.      Comments: Gait normal.    Skin:     General: Skin is warm and dry.      Coloration: Skin is not pale.      Findings: No erythema.      Comments: No venous staining   Neurological:      Mental Status: He is alert and oriented to person, place, and time.   Psychiatric:    "      Speech: Speech normal.         Behavior: Behavior is cooperative.         Thought Content: Thought content normal.         Judgment: Judgment normal.         No results found for: BUN, CREATININE, EGFRIFNONA, EGFRIFAFRI    ASSESSMENT:  Diagnoses and all orders for this visit:    1. Benign essential HTN (Primary)    2. SUNDEEP (obstructive sleep apnea)    3. Deep vein thrombosis (DVT) associated with COVID-19    4. Class 3 severe obesity due to excess calories with serious comorbidity and body mass index (BMI) of 45.0 to 49.9 in adult (HCC)      PLAN:  Detailed discussion with Jomar Ahn regarding situation and options.  RIGHT dvt resolved..  Multiple risk factors with severe comorbidities.  No intervention indicated at this time.  Will follow with interval imaging.  Risks, benefits discussed.  Understands and wishes to proceed with plan.    Finish remaining eliquis RX  conservative measures (weight reduction, daily exercise program, compression stockings, leg elevation)      Return as needed.  Obesity Class  3. Increased risk cardiovascular events, sleep and breathing disorders, joint issues, type 2 diabetes mellitus. Options for weight management, heart healthy diet, exercise programs, and associated health risks of obesity discussed.    Recommended regular physical activity, progressive walking program.  Continue current medications as directed.  Will Obtain relevant old records.  Advance Care Planning   ACP discussion was declined by the patient. Patient does not have an advance directive, declines further assistance.     Thank you for the opportunity to participate in this patient's care.    Copy to primary care provider.         (4) walks frequently